# Patient Record
Sex: FEMALE | Race: WHITE | NOT HISPANIC OR LATINO | Employment: PART TIME | URBAN - METROPOLITAN AREA
[De-identification: names, ages, dates, MRNs, and addresses within clinical notes are randomized per-mention and may not be internally consistent; named-entity substitution may affect disease eponyms.]

---

## 2023-04-15 LAB
ALANINE AMINOTRANSFERASE (SGPT) (U/L) IN SER/PLAS: 18 U/L (ref 7–45)
ALBUMIN (G/DL) IN SER/PLAS: 4.3 G/DL (ref 3.4–5)
ALKALINE PHOSPHATASE (U/L) IN SER/PLAS: 52 U/L (ref 33–110)
ANION GAP IN SER/PLAS: 12 MMOL/L (ref 10–20)
ASPARTATE AMINOTRANSFERASE (SGOT) (U/L) IN SER/PLAS: 16 U/L (ref 9–39)
BASOPHILS (10*3/UL) IN BLOOD BY AUTOMATED COUNT: 0.03 X10E9/L (ref 0–0.1)
BASOPHILS/100 LEUKOCYTES IN BLOOD BY AUTOMATED COUNT: 0.8 % (ref 0–2)
BILIRUBIN TOTAL (MG/DL) IN SER/PLAS: 0.4 MG/DL (ref 0–1.2)
CALCIUM (MG/DL) IN SER/PLAS: 9.3 MG/DL (ref 8.6–10.3)
CARBON DIOXIDE, TOTAL (MMOL/L) IN SER/PLAS: 28 MMOL/L (ref 21–32)
CHLORIDE (MMOL/L) IN SER/PLAS: 103 MMOL/L (ref 98–107)
CHOLESTEROL (MG/DL) IN SER/PLAS: 193 MG/DL (ref 0–199)
CHOLESTEROL IN HDL (MG/DL) IN SER/PLAS: 68 MG/DL
CHOLESTEROL/HDL RATIO: 2.8
CREATININE (MG/DL) IN SER/PLAS: 0.78 MG/DL (ref 0.5–1.05)
EOSINOPHILS (10*3/UL) IN BLOOD BY AUTOMATED COUNT: 0.08 X10E9/L (ref 0–0.7)
EOSINOPHILS/100 LEUKOCYTES IN BLOOD BY AUTOMATED COUNT: 2.1 % (ref 0–6)
ERYTHROCYTE DISTRIBUTION WIDTH (RATIO) BY AUTOMATED COUNT: 13.2 % (ref 11.5–14.5)
ERYTHROCYTE MEAN CORPUSCULAR HEMOGLOBIN CONCENTRATION (G/DL) BY AUTOMATED: 33.9 G/DL (ref 32–36)
ERYTHROCYTE MEAN CORPUSCULAR VOLUME (FL) BY AUTOMATED COUNT: 96 FL (ref 80–100)
ERYTHROCYTES (10*6/UL) IN BLOOD BY AUTOMATED COUNT: 3.92 X10E12/L (ref 4–5.2)
ESTIMATED AVERAGE GLUCOSE FOR HBA1C: 91 MG/DL
GFR FEMALE: >90 ML/MIN/1.73M2
GLUCOSE (MG/DL) IN SER/PLAS: 98 MG/DL (ref 74–99)
HEMATOCRIT (%) IN BLOOD BY AUTOMATED COUNT: 37.8 % (ref 36–46)
HEMOGLOBIN (G/DL) IN BLOOD: 12.8 G/DL (ref 12–16)
HEMOGLOBIN A1C/HEMOGLOBIN TOTAL IN BLOOD: 4.8 %
IMMATURE GRANULOCYTES/100 LEUKOCYTES IN BLOOD BY AUTOMATED COUNT: 0.3 % (ref 0–0.9)
LDL: 106 MG/DL (ref 0–99)
LEUKOCYTES (10*3/UL) IN BLOOD BY AUTOMATED COUNT: 3.9 X10E9/L (ref 4.4–11.3)
LYMPHOCYTES (10*3/UL) IN BLOOD BY AUTOMATED COUNT: 0.91 X10E9/L (ref 1.2–4.8)
LYMPHOCYTES/100 LEUKOCYTES IN BLOOD BY AUTOMATED COUNT: 23.5 % (ref 13–44)
MONOCYTES (10*3/UL) IN BLOOD BY AUTOMATED COUNT: 0.48 X10E9/L (ref 0.1–1)
MONOCYTES/100 LEUKOCYTES IN BLOOD BY AUTOMATED COUNT: 12.4 % (ref 2–10)
NEUTROPHILS (10*3/UL) IN BLOOD BY AUTOMATED COUNT: 2.37 X10E9/L (ref 1.2–7.7)
NEUTROPHILS/100 LEUKOCYTES IN BLOOD BY AUTOMATED COUNT: 60.9 % (ref 40–80)
PLATELETS (10*3/UL) IN BLOOD AUTOMATED COUNT: 261 X10E9/L (ref 150–450)
POTASSIUM (MMOL/L) IN SER/PLAS: 4.1 MMOL/L (ref 3.5–5.3)
PROTEIN TOTAL: 7.1 G/DL (ref 6.4–8.2)
SODIUM (MMOL/L) IN SER/PLAS: 139 MMOL/L (ref 136–145)
THYROTROPIN (MIU/L) IN SER/PLAS BY DETECTION LIMIT <= 0.05 MIU/L: 3.05 MIU/L (ref 0.44–3.98)
TRIGLYCERIDE (MG/DL) IN SER/PLAS: 96 MG/DL (ref 0–149)
UREA NITROGEN (MG/DL) IN SER/PLAS: 12 MG/DL (ref 6–23)
VLDL: 19 MG/DL (ref 0–40)

## 2023-08-22 PROBLEM — I10 BENIGN ESSENTIAL HYPERTENSION: Status: ACTIVE | Noted: 2023-08-22

## 2023-08-22 PROBLEM — N93.9 ABNORMAL UTERINE BLEEDING: Status: ACTIVE | Noted: 2023-08-22

## 2023-08-22 RX ORDER — LEVOTHYROXINE SODIUM 50 UG/1
1 TABLET ORAL
COMMUNITY
Start: 2022-04-11

## 2023-08-22 RX ORDER — LOSARTAN POTASSIUM AND HYDROCHLOROTHIAZIDE 12.5; 1 MG/1; MG/1
1 TABLET ORAL DAILY
COMMUNITY

## 2023-10-02 ENCOUNTER — TREATMENT (OUTPATIENT)
Dept: PHYSICAL THERAPY | Facility: CLINIC | Age: 48
End: 2023-10-02
Payer: COMMERCIAL

## 2023-10-02 DIAGNOSIS — N94.89 OTHER SPECIFIED CONDITIONS ASSOCIATED WITH FEMALE GENITAL ORGANS AND MENSTRUAL CYCLE: ICD-10-CM

## 2023-10-02 DIAGNOSIS — M62.89 HIGH-TONE PELVIC FLOOR DYSFUNCTION: Primary | ICD-10-CM

## 2023-10-02 PROCEDURE — 97162 PT EVAL MOD COMPLEX 30 MIN: CPT | Mod: GP | Performed by: PHYSICAL THERAPIST

## 2023-10-02 PROCEDURE — 97110 THERAPEUTIC EXERCISES: CPT | Mod: GP | Performed by: PHYSICAL THERAPIST

## 2023-10-02 ASSESSMENT — PAIN SCALES - GENERAL: PAINLEVEL_OUTOF10: 5 - MODERATE PAIN

## 2023-10-02 ASSESSMENT — PAIN - FUNCTIONAL ASSESSMENT: PAIN_FUNCTIONAL_ASSESSMENT: 0-10

## 2023-10-02 NOTE — PROGRESS NOTES
Subjective Physical Therapy    Physical Therapy Evaluation and Treatment      Patient Name: Nita Miller  MRN: [id number]  Today's Date: 10/2/2023  Time Calculation  Start Time: 0315  Stop Time: 0400  Time Calculation (min): 45 min      Assessment:  Patient presents with high tone pelvic floor dysfunction causing pain with orgasm, which began 3 months ago.  Skilled PT is indicated to address pain, posture, strength, soft tissue condition, and quality of life.      Goals:  1.  No pain with orgasm.                 2.  Decrease PM voids to 0-1x.                 3.  Increase soft tissue condition to good.                 4.  Increase postural awareness/alignment to good.                 5.  Increase lumbar mobility noted by curve reversal with FB and (-) PSIS migration.                 6.  Patient will demonstrate the ability to contract-relax-eccentrically lengthen her pelvic floor.                  7.  Patient will demonstrate good control with Levels 2 and 3 TA excercises.                  8.  NIH-CPSI = 4 or less.                  9.  Patient will be independent with a home exercise program.    Plan:  Treatment/Interventions: Biofeedback, Education/ Instruction, Manual therapy, Neuromuscular re-education, Therapeutic exercises  PT Frequency: 1 time per week  Duration: 5  Rehab Potential: Good  Plan of Care Agreement: Patient    Current Problem:   1. High-tone pelvic floor dysfunction        2. Other specified conditions associated with female genital organs and menstrual cycle  Referral to Physical Therapy          Subjective  Patient discontinued birth control 1 year ago.  3 months ago, she began experiencing supra-pubic pelvic pain with orgasm.  1 month ago she had a uterine polyp removed, which reduced the intensity of her symptoms.  She also experienced the same sensation once last week when she held her urine longer than normal.       Precautions/PMH:  HTN.  2 Vaginal deliveries.  Thyroid disorder.   No  recent falls.       Pain:  Pain Assessment  Pain Assessment: 0-10  Pain Score: 5 - Moderate pain    Bladder:  Reports normal daytime frequency, but is likely closest to 1x q 2 hours.  PM 1-2x.  No urgency.  Has had stress incontinence, but no lately.      Bowel:  Evacuates daily without straining.      [place]:  Discomfort with deep penetration.  Supra-pubic pain with orgasm that resolves quickly.      NIH-CPSI = 15.          Objective   Inspection:  Posture-  Increased lumbar lordosis with segmental rotations, APT.                        Breathing-  Good diaphragm excursion.     Pelvis:  Grossly symmetrical.      Trunk Mobility/SI:  Restricted lumbar flexion with FB.  (+) PSIS Migration.      ROM/Flexibility:  BLE's in supine WNL.  Prone- n/a.      Strength:  Isolated core contractions-  TA Fair, PF Trace (external palpation).                     Resting tone- n/a.      Palpation:  Internal-  n/a.  External-  Pain and tightness along pubic bone and bilateral hip flexors.  Lumbar paraspinals tight.     Treatments:  Education on core/PF function and healthy bladder habits.  Patient to be mindful of actual daytime toileting schedule.  Reviewed good toileting posture.  Patient to utilize diaphragmatic breathing to assist emptying.  Patient instructed in pelvic tilts to decompress her low back and to find neutral spine.  Instructed to perform self massage along her pubic bone.

## 2023-10-19 ENCOUNTER — APPOINTMENT (OUTPATIENT)
Dept: PHYSICAL THERAPY | Facility: CLINIC | Age: 48
End: 2023-10-19
Payer: COMMERCIAL

## 2023-11-13 ENCOUNTER — TELEPHONE (OUTPATIENT)
Dept: OBSTETRICS AND GYNECOLOGY | Facility: CLINIC | Age: 48
End: 2023-11-13

## 2023-11-13 ENCOUNTER — TREATMENT (OUTPATIENT)
Dept: PHYSICAL THERAPY | Facility: CLINIC | Age: 48
End: 2023-11-13
Payer: COMMERCIAL

## 2023-11-13 DIAGNOSIS — N94.89 OTHER SPECIFIED CONDITIONS ASSOCIATED WITH FEMALE GENITAL ORGANS AND MENSTRUAL CYCLE: ICD-10-CM

## 2023-11-13 DIAGNOSIS — M62.89 HIGH-TONE PELVIC FLOOR DYSFUNCTION: ICD-10-CM

## 2023-11-13 PROCEDURE — 97140 MANUAL THERAPY 1/> REGIONS: CPT | Mod: GP | Performed by: PHYSICAL THERAPIST

## 2023-11-13 PROCEDURE — 97110 THERAPEUTIC EXERCISES: CPT | Mod: GP | Performed by: PHYSICAL THERAPIST

## 2023-11-13 ASSESSMENT — PAIN - FUNCTIONAL ASSESSMENT: PAIN_FUNCTIONAL_ASSESSMENT: 0-10

## 2023-11-13 ASSESSMENT — PAIN SCALES - GENERAL: PAINLEVEL_OUTOF10: 4

## 2023-11-13 NOTE — TELEPHONE ENCOUNTER
Patient called and wanted to know if in her ultrasound from 6/2023 if it says her right ovary has an 11.1 cm cyst or if its supposed to be a 1.1. There is a space between the first two ones. She is just wanting clarification.

## 2023-11-13 NOTE — PROGRESS NOTES
Physical Therapy    Physical Therapy Treatment    Patient Name: Nita Miller  MRN: [id number]  Today's Date: 11/13/2023  Visit 2/5 per poc  Time Calculation  Start Time: 0400  Stop Time: 0500  Time Calculation (min): 60 min    Assessment:  Improved knowledge of condition.  Symptoms decreasing.  Increased soft tissue condition and postural awareness.       Plan:  Continue to progress as tolerated.       Current Problem  1. Other specified conditions associated with female genital organs and menstrual cycle  Follow Up In Physical Therapy      2. High-tone pelvic floor dysfunction  Follow Up In Physical Therapy          Subjective   Patient has been focusing on emptying her bladder.  Using diaphragmatic breathing.  Performing PPT in the morning.    [place]-  pain with orgasm slightly decreased.       Precautions  Precautions  Precautions Comment: No recent falls.     Pain  Pain Assessment: 0-10  Pain Score: 4    Objective      Posture-  increased lumbar lordosis/APT.  Lumbar spine stays extended in supine.       Treatments  TherX-  Reviewed bladder habits and strategies to improve emptying.  Reviewed diaphragmatic breathing and discussed why it is helpful.  Instructed in the use of three fast kegels to address frequency.      Practiced PPT in [facility] after education on position.  Used pelvic tilts in sit and stand to find neutral spine position.     Instructed in and performed single knee to chest with opposite leg straight to lengthen hip flexors.  Handout emailed.      MTT-  OP PF.  TP release at pubes.  STM lower abdominals and hip flexors.

## 2023-12-28 ENCOUNTER — APPOINTMENT (OUTPATIENT)
Dept: PHYSICAL THERAPY | Facility: CLINIC | Age: 48
End: 2023-12-28
Payer: COMMERCIAL

## 2024-07-05 ENCOUNTER — DOCUMENTATION (OUTPATIENT)
Dept: PHYSICAL THERAPY | Facility: CLINIC | Age: 49
End: 2024-07-05
Payer: COMMERCIAL

## 2024-07-05 NOTE — PROGRESS NOTES
Physical Therapy    Discharge Summary    Name: Nita Miller  MRN: [id number]  : 1975  Date: 24    Discharge Summary: PT    Discharge Information: Date of last visit 23, Date of evaluation 10-2-23, Number of attended visits 2, Referred by Dr. Montero, and Referred for [facility]PFD.    Rehab Discharge Reason: Other DC due to length of time since last visit.

## 2025-01-09 ASSESSMENT — ENCOUNTER SYMPTOMS
HEADACHES: 0
DYSURIA: 0
BELCHING: 0
DIARRHEA: 1
FREQUENCY: 0
ANOREXIA: 0
FLATUS: 0
NAUSEA: 0
WEIGHT LOSS: 0
VOMITING: 0
ABDOMINAL PAIN: 1
HEMATOCHEZIA: 0
FEVER: 0
HEMATURIA: 0
MYALGIAS: 1
ARTHRALGIAS: 1
CONSTIPATION: 0

## 2025-01-10 ENCOUNTER — APPOINTMENT (OUTPATIENT)
Dept: OBSTETRICS AND GYNECOLOGY | Facility: CLINIC | Age: 50
End: 2025-01-10
Payer: COMMERCIAL

## 2025-01-10 VITALS
BODY MASS INDEX: 33.29 KG/M2 | DIASTOLIC BLOOD PRESSURE: 72 MMHG | SYSTOLIC BLOOD PRESSURE: 120 MMHG | HEIGHT: 65 IN | WEIGHT: 199.8 LBS

## 2025-01-10 DIAGNOSIS — R10.2 PELVIC PAIN IN FEMALE: ICD-10-CM

## 2025-01-10 DIAGNOSIS — Z12.31 ENCOUNTER FOR SCREENING MAMMOGRAM FOR MALIGNANT NEOPLASM OF BREAST: ICD-10-CM

## 2025-01-10 DIAGNOSIS — Z01.419 ENCNTR FOR GYN EXAM (GENERAL) (ROUTINE) W/O ABN FINDINGS: Primary | ICD-10-CM

## 2025-01-10 PROCEDURE — 3078F DIAST BP <80 MM HG: CPT | Performed by: ADVANCED PRACTICE MIDWIFE

## 2025-01-10 PROCEDURE — 1036F TOBACCO NON-USER: CPT | Performed by: ADVANCED PRACTICE MIDWIFE

## 2025-01-10 PROCEDURE — 3074F SYST BP LT 130 MM HG: CPT | Performed by: ADVANCED PRACTICE MIDWIFE

## 2025-01-10 PROCEDURE — 99396 PREV VISIT EST AGE 40-64: CPT | Performed by: ADVANCED PRACTICE MIDWIFE

## 2025-01-10 PROCEDURE — 3008F BODY MASS INDEX DOCD: CPT | Performed by: ADVANCED PRACTICE MIDWIFE

## 2025-01-10 ASSESSMENT — PATIENT HEALTH QUESTIONNAIRE - PHQ9
2. FEELING DOWN, DEPRESSED OR HOPELESS: NOT AT ALL
SUM OF ALL RESPONSES TO PHQ9 QUESTIONS 1 AND 2: 0
1. LITTLE INTEREST OR PLEASURE IN DOING THINGS: NOT AT ALL

## 2025-01-10 NOTE — PROGRESS NOTES
"GYNECOLOGY PROGRESS NOTE        CC:  see below. Patient has seen  for pain and had a polyp removed in the past and had seen pelvic floor therapy due to postorgasmic pain  pubic bone source. She feels that the pelvic floor therapy did help her in the past.   She is now having pressure and then numbness along the left side of her pelvis and down. The numbness varies in how quickly it occurs. The numbness occurs with laying down, sitting and even standing for more then 10minutes. This has been occurring for about 2-3months. She is having other issues and health concerns and is seeing other providers also to rule other conditions out. She sees Rheumatology soon for evaluation.   Chief Complaint   Patient presents with    Annual Exam     Est pt annual  Pap 6/2023 neg hpv neg  Mamm 8/2022 cat 1  Patient having left sided pelvic pain.         HPI:  Nita Miller is here for a routine GYN examination.  No GYN c/o, no AUB.            ROS:  GI - no blood in BMs  URO - no hematuria  GYN - no AUB or vaginal discharge  PSYCH - mood OK        PHYSICAL EXAM:  /72 (BP Location: Left arm, Patient Position: Sitting, BP Cuff Size: Adult)   Ht 1.651 m (5' 5\")   Wt 90.6 kg (199 lb 12.8 oz)   BMI 33.25 kg/m²   GEN:  A&O, NAD  ABD:  NT/ND, soft, no palpable masses  URO:  normal urethra, no bladder TTP  GYN:  normal vulva and perineum w/o lesions or ulcers, normal vagina without discharge or lesions, normal cervix without lesions or discharge or CMT, uterus NT/NE, adnexa mobile and NT/NE  BREAST:  no masses or TTP, no skin lesions or nipple discharge  PSYCH:  normal affect, non-anxious      IMPRESSION/PLAN:    A: normal exam. No masses noted with pelvic. Pressure/numbness down Left side that varies on how soon is occurs depending on position. Normal pap 6/2023.   Plan: 1. Pap due 2026. 2.mammogram order placed. 3. Pelvic sonogram, if all wnl then consider either follow up again with pelvic floor PT or back " to see  for further evaluation.   Problem List Items Addressed This Visit    None  Visit Diagnoses       Encounter for screening mammogram for malignant neoplasm of breast                Pap and HPV normal in 2023, no Hx of HGSIL, next due in 2028.   [facility] pap smear screening guidelines reviewed with the patient.          MILLI Sutton  Answers submitted by the patient for this visit:  Abdominal Pain Questionnaire (Submitted on 1/9/2025)  Chief Complaint: Abdominal pain  Chronicity: new  Onset: more than 1 month ago  Onset quality: gradual  Frequency: daily  Episode duration: 2 Hours  Progression since onset: rapidly worsening  Pain location: LLQ  Pain - numeric: 3/10  Pain quality: burning  Radiates to: left flank  anorexia: No  arthralgias: Yes  belching: No  constipation: No  diarrhea: Yes  dysuria: No  fever: No  flatus: No  frequency: No  headaches: No  hematochezia: No  hematuria: No  melena: No  myalgias: Yes  nausea: No  weight loss: No  vomiting: No  Aggravated by: certain positions  Relieved by: certain positions

## 2025-01-13 ENCOUNTER — HOSPITAL ENCOUNTER (OUTPATIENT)
Dept: RADIOLOGY | Facility: HOSPITAL | Age: 50
Discharge: HOME | End: 2025-01-13
Payer: COMMERCIAL

## 2025-01-13 DIAGNOSIS — R10.2 PELVIC PAIN IN FEMALE: ICD-10-CM

## 2025-01-13 PROCEDURE — 76856 US EXAM PELVIC COMPLETE: CPT

## 2025-01-13 PROCEDURE — 76830 TRANSVAGINAL US NON-OB: CPT | Performed by: RADIOLOGY

## 2025-01-13 PROCEDURE — 76856 US EXAM PELVIC COMPLETE: CPT | Performed by: RADIOLOGY

## 2025-01-17 ENCOUNTER — APPOINTMENT (OUTPATIENT)
Facility: CLINIC | Age: 50
End: 2025-01-17
Payer: COMMERCIAL

## 2025-01-25 ENCOUNTER — HOSPITAL ENCOUNTER (OUTPATIENT)
Dept: RADIOLOGY | Facility: HOSPITAL | Age: 50
Discharge: HOME | End: 2025-01-25
Payer: COMMERCIAL

## 2025-01-25 VITALS — HEIGHT: 65 IN | BODY MASS INDEX: 33.15 KG/M2 | WEIGHT: 199 LBS

## 2025-01-25 DIAGNOSIS — Z12.31 ENCOUNTER FOR SCREENING MAMMOGRAM FOR MALIGNANT NEOPLASM OF BREAST: ICD-10-CM

## 2025-01-25 PROCEDURE — 77067 SCR MAMMO BI INCL CAD: CPT | Mod: BILATERAL PROCEDURE | Performed by: RADIOLOGY

## 2025-01-25 PROCEDURE — 77063 BREAST TOMOSYNTHESIS BI: CPT | Mod: BILATERAL PROCEDURE | Performed by: RADIOLOGY

## 2025-01-25 PROCEDURE — 77067 SCR MAMMO BI INCL CAD: CPT

## 2025-02-03 ENCOUNTER — HOSPITAL ENCOUNTER (OUTPATIENT)
Dept: RADIOLOGY | Facility: CLINIC | Age: 50
Discharge: HOME | End: 2025-02-03
Payer: COMMERCIAL

## 2025-02-03 ENCOUNTER — OFFICE VISIT (OUTPATIENT)
Dept: ORTHOPEDIC SURGERY | Facility: CLINIC | Age: 50
End: 2025-02-03
Payer: COMMERCIAL

## 2025-02-03 DIAGNOSIS — M79.642 BILATERAL HAND PAIN: ICD-10-CM

## 2025-02-03 DIAGNOSIS — G56.02 CARPAL TUNNEL SYNDROME OF LEFT WRIST: Primary | ICD-10-CM

## 2025-02-03 DIAGNOSIS — M79.641 BILATERAL HAND PAIN: ICD-10-CM

## 2025-02-03 DIAGNOSIS — M19.032 DRUJ (DISTAL RADIOULNAR JOINT) ARTHROSIS, PRIMARY, LEFT: ICD-10-CM

## 2025-02-03 PROCEDURE — 73130 X-RAY EXAM OF HAND: CPT | Mod: 50

## 2025-02-03 PROCEDURE — 2500000004 HC RX 250 GENERAL PHARMACY W/ HCPCS (ALT 636 FOR OP/ED): Performed by: ORTHOPAEDIC SURGERY

## 2025-02-03 PROCEDURE — 20526 THER INJECTION CARP TUNNEL: CPT | Mod: LT | Performed by: ORTHOPAEDIC SURGERY

## 2025-02-03 PROCEDURE — 20605 DRAIN/INJ JOINT/BURSA W/O US: CPT | Mod: LT | Performed by: ORTHOPAEDIC SURGERY

## 2025-02-03 PROCEDURE — 99214 OFFICE O/P EST MOD 30 MIN: CPT | Performed by: ORTHOPAEDIC SURGERY

## 2025-02-03 PROCEDURE — 73130 X-RAY EXAM OF HAND: CPT | Mod: BILATERAL PROCEDURE | Performed by: STUDENT IN AN ORGANIZED HEALTH CARE EDUCATION/TRAINING PROGRAM

## 2025-02-03 PROCEDURE — 99214 OFFICE O/P EST MOD 30 MIN: CPT | Mod: 25 | Performed by: ORTHOPAEDIC SURGERY

## 2025-02-03 RX ORDER — LIDOCAINE HYDROCHLORIDE 10 MG/ML
1 INJECTION, SOLUTION INFILTRATION; PERINEURAL
Status: COMPLETED | OUTPATIENT
Start: 2025-02-03 | End: 2025-02-03

## 2025-02-03 RX ADMIN — TRIAMCINOLONE ACETONIDE 10 MG: 10 INJECTION, SUSPENSION INTRA-ARTICULAR; INTRALESIONAL at 15:41

## 2025-02-03 RX ADMIN — LIDOCAINE HYDROCHLORIDE 1 ML: 10 INJECTION, SOLUTION INFILTRATION; PERINEURAL at 15:41

## 2025-02-03 NOTE — PROGRESS NOTES
History present illness: Patient presents today for evaluation of bilateral hands and wrist.  She describes a general polyarthralgia affecting small joints of hands and larger joints as well.  She states that rheumatoid panel was done showing only elevated serum uric acid and CRP.  The patient has pain worse on the left as compared to the right.  She describes symptoms compatible with left carpal tunnel syndrome and ulnar-sided wrist pain worse with rotational motion.  She has right wrist pain as well, also ulnar-sided.  No discrete injury.      Past medical history: The patient's past medical history, family history, social history, and review of systems were documented on the patient medical intake.  The updated data was reviewed in the electronic medical record.  History is negative except otherwise stated in history of present illness.        Physical examination:  General: Alert and oriented to person, place, and time.  No acute distress and breathing comfortably: Pleasant and cooperative with examination.  HEENT: Head is normocephalic and atraumatic.  Neck: Supple, no visible swelling.  Cardiovascular: No palpable tachycardia  Lungs: No audible wheezing or labored breathing  Abdomen: Nondistended.  Extremities: Evaluation of the bilateral upper extremities finds the patient had palpable radial artery at the wrists with brisk capillary refill to all digits.  Patient has intact sensation to axillary radial median and ulnar nerves.  There are no open wounds.  There are no signs of infection.  There is no evidence of lymphedema or lymphatic streaking.  The patient has supple compartments to bilateral arm forearm and hand.  Mild tenderness to right wrist at DRUJ.  More impressive tenderness over left wrist at DRUJ.  Positive Tinel's over course of median nerve to left wrist.      Radiology: Bilateral DRUJ arthritic change      Assessment: Bilateral DRUJ arthritis.  Left carpal tunnel syndrome.      Plan: Treatment  options were discussed.  We talked about trial of steroid injection to left carpal tunnel and left wrist at [facility].  Patient is agreeable with this strategy.  Observe the right for now.  Follow-up with me in 6 weeks.  She has an appointment to see rheumatology for ongoing discussion regarding possible autoimmune origin.  Certainly the characteristics of her [facility] arthritis are suspicious for autoimmune process as is the general polyarthralgia.        Procedure:  Hand / UE Inj/Asp: L carpal tunnel for carpal tunnel syndrome on 2/3/2025 3:41 PM  Indications: diagnostic and therapeutic  Details: 25 G needle, volar approach  Medications: 10 mg triamcinolone acetonide 10 mg/mL; 1 mL lidocaine 10 mg/mL (1 %)  Outcome: tolerated well, no immediate complications    Left Carpal Tunnel Injection: It was explained to the patient that the risks of a steroid injection include but are not limited to infection, local skin irritation, skin atrophy, calcification, continued pain and discomfort, elevated blood sugar, burning, failure to relieve pain, and possible late infection. The patient verbalized good insight and verbalized consent for the injection. It was further explained that the post-injection discomfort can be alleviated with additional medications, ice, elevation, and rest over the first 24 hours, and that these modalities are recommended.  After informed consent was provided, patient identification was confirmed, and allergies were verified, the patient was appropriately positioned. The site was marked and time-out performed.    Using aseptic technique, a solution containing 1 mL of 10 mg of Kenalog and 1 mL of 1% lidocaine without epinephrine was injected into the patient's left carpal tunnel. A 25-gauge needle was inserted just ulnar to the anatomic course of the palmaris longus tendon at the level of the distal wrist flexion crease. It was slowly advanced deep to the distal antebrachial fascia. The solution was then  injected slowly, taking care to ensure that the patient experienced no paresthesias during the injection of the solution. After injection of the solution, the patient was asked to perform active flexion and extension of the digits and wrist to help disperse the solution. A band-aid was then placed at the injection site. The patient tolerated this left carpal tunnel injection well.      Procedure, treatment alternatives, risks and benefits explained, specific risks discussed. Consent was given by the patient. Immediately prior to procedure a time out was called to verify the correct patient, procedure, equipment, support staff and site/side marked as required. Patient was prepped and draped in the usual sterile fashion.       M Inj/Asp: L distal radioulnar on 2/3/2025 3:41 PM  Indications: pain  Details: 25 G needle, dorsal approach  Medications: 1 mL lidocaine 10 mg/mL (1 %); 10 mg triamcinolone acetonide 10 mg/mL  Outcome: tolerated well, no immediate complications    Left Wrist Distal Radioulnar Joint Injection: It was explained to the patient that the risks of a steroid injection include but are not limited to infection, local skin irritation, skin atrophy, calcification, continued pain and discomfort, elevated blood sugar, burning, failure to relieve pain, and possible late infection. The patient verbalized good insight and verbalized consent for the injection. It was further explained that the post-injection discomfort can be alleviated with additional medications, ice, elevation, and rest over the first 24 hours, and that these modalities are recommended.    Using aseptic technique, a solution containing 1 cc of 10 mg of Kenalog and 1.0 mL of 1% lidocaine without epinephrine was injected intra-articularly to the left wrist distal radioulnar joint. Digital palpation was used to localize the DRUJ articulation about the dorsal aspect of the joint. A 25-gauge needle was advanced through the skin, subcutaneous  tissue and capsule. The injection was then administered and the patient tolerated the injection well. A band-aid was then placed. It should be noted that ethyl chloride spray was used to make the injection delivery more comfortable for the patient.  Procedure, treatment alternatives, risks and benefits explained, specific risks discussed. Consent was given by the patient. Immediately prior to procedure a time out was called to verify the correct patient, procedure, equipment, support staff and site/side marked as required. Patient was prepped and draped in the usual sterile fashion.

## 2025-02-09 NOTE — PROGRESS NOTES
Subjective   Patient ID: [id number]   Nita Miller is a 49 y.o. female with HTN, hypothyroidism, who presents for polyarthralgia, referred by ortho Ms. Cordoba    Current rheum meds:  - Aleve BID     Previous rheum meds:  - Ibuprofen  - Steroids    HPI  Saw ortho this month for kamilla hands and wrists pain, diagnosed with OA and CTS, had steroid shots to her left wrist and CTS  In September, developed Achilles tendinitis, plantar fasciitis, tennis elbows and IT band on both knees, all at the same time, might have taken ibuprofen, they didn't last long  In October, both wrists and knees became painful  Mainly pain in the morning  No injuries   Started taking taking ibuprofen systemically, helped some but not much, still couldn't walk in the morning   Right 1st IP and left 2nd DIP pain and swelling started after that, limited mobility  Dr. Ledezma did injection of the left wrist and CTS   The injections helped a lot  Now feels 80% better  Took steroids before and they didn't help  No IBP  No buttocks pains  No eye inflammation    + numbness of the left leg   + Swelling of her left leg, from the knee down  + used to get costochondritis with increased activity   + her ears get red and painful when she is tired  + numbness in her left hand, distribution of the median nerve     PSH: No MSK surgeries   Allergies: NKDA   Habits: No smoking, drinks 3 glasses of alcohol 5 times a week, no recreational drugs   Social hx: Works as an ortho PA,      ROS:  Constitutional: Denies fever, chills, weight loss, night sweats or headaches  Eyes: Denies dry eyes, blurry vision, redness or pain  ENT: Denies dry mouth, dental loss, loss of taste, nasal or oral ulcers, jaw claudication, difficulty swallowing, nasal crusting or recurrent sinus infections   Cardiovascular: Denies chest pain, palpitations, orthopnea  Respiratory: Denies shortness of breath, cough, asthma, or recurrent respiratory infections  Gastrointestinal: Denies  dysphagia, nausea, vomiting, heartburn, abdominal pain, constipation, diarrhea, melena or hematochezia  Genitourinary: No recurrent urinary infections or STDs, no genital or anal ulcers  Integumentary: Denies photosensitivity, rash or lesions, Raynaud's phenomenon, skin tightening, digital ulcers, psoriatic lesions, or alopecia  Neurological: Denies muscle weakness, or incontinence   MSK:  No inflammatory back pain,  Muscular: Denies weakness, difficulty rising from chair or combing the hair, muscle aches, or problems with hand    FHx: Hypothyroidism    Patient Active Problem List   Diagnosis    Benign essential hypertension    Abnormal uterine bleeding    High-tone pelvic floor dysfunction      Past Medical History:   Diagnosis Date    Abnormal Pap smear of cervix     CTS (carpal tunnel syndrome)     Disease of thyroid gland     HPV (human papilloma virus) infection     Hypertension     Hypothyroidism     Plantar fasciitis     Tendinitis     Varicella      Past Surgical History:   Procedure Laterality Date    CERVICAL BIOPSY  W/ LOOP ELECTRODE EXCISION      COLPOSCOPY      OTHER SURGICAL HISTORY  10/12/2022    Corneal lasik    OTHER SURGICAL HISTORY  10/12/2022    [place] tooth extraction     Social History     Socioeconomic History    Marital status:      Spouse name: Not on file    Number of children: Not on file    Years of education: Not on file    Highest education level: Not on file   Occupational History    Not on file   Tobacco Use    Smoking status: Never    Smokeless tobacco: Never   Substance and Sexual Activity    Alcohol use: Yes     Alcohol/week: 10.0 standard drinks of alcohol     Types: 10 Glasses of wine per week    Drug use: Never    Sexual activity: Yes     Partners: Male     Birth control/protection: None   Other Topics Concern    Not on file   Social History Narrative    Not on file     Social Drivers of Health     Financial Resource Strain: Not on file   Food Insecurity: Not on file    Transportation Needs: Not on file   Physical Activity: Not on file   Stress: Not on file   Social Connections: Not on file   Intimate Partner Violence: Not on file   Housing Stability: Not on file      No Known Allergies     Current Outpatient Medications:     levothyroxine (Synthroid, Levoxyl) 50 mcg tablet, Take 1 tablet (50 mcg) by mouth once daily in the morning. Take before meals., Disp: , Rfl:     losartan-hydrochlorothiazide (Hyzaar) 100-12.5 mg tablet, Take 1 tablet by mouth once daily., Disp: , Rfl:      Objective   Visit Vitals  /80   Pulse 92   Temp 36.7 °C (98.1 °F)   Resp 20   Wt 91.2 kg (201 lb)   BMI 33.09 kg/m²   OB Status Having periods   Smoking Status Never   BSA 2.05 m²     Physical Exam:  General: AAOx3, Cooperative  Head: normocephalic, atraumatic, no hair loss   Eyes: EOMI, conjunctiva clear, sclera white, anicteric  Ears: hearing intact  Nose: no deformity, no crusting   Throat/Mouth: No oral deformities, no cheek swelling, mucosa appear moist, no oral ulcers noted or loss of dentition   Skin: No rashes, ulcers or photosensitive areas  MSK: Upper Extremities:  TTP of the costochondral areas  Hand/Fingers: No erythema, edema, tenderness or warmth at DIP, PIP, or MCP joints, FROM grossly. Good hand . No nodules. Heberden's nodes  Wrists: No erythema, edema, warmth or tenderness at wrist, FROM grossly  Elbows: No tenderness, edema, erythema or warmth at elbows, FROM grossly. No nodules   Shoulders: No edema, erythema, tenderness or warmth at shoulders. FROM  Lower Extremities:   Hips: No obvious deformities. No joint tenderness, normal ROM grossly. Log roll test negative bilaterally. Jyotsna test is negative bilaterally. No trochanteric bursae TTP  Knees: No tenderness, deformities, edema, rashes, or warmth, normal ROM grossly. No crepitus, no pes anserine bursa TTP   Right calf is a bit bigger than the left calf  Ankles, feet: No deformities, tenderness, edema, erythema, ulceration,  or warmth at the ankle or MTP/IP joints, normal ROM grossly  Spine: No spinal tenderness to palpation. No SI joint tenderness      Lab Results   Component Value Date    WBC 3.9 (L) 04/15/2023    HGB 12.8 04/15/2023    HCT 37.8 04/15/2023    MCV 96 04/15/2023     04/15/2023        Chemistry    Lab Results   Component Value Date/Time     04/15/2023 1004    K 4.1 04/15/2023 1004     04/15/2023 1004    CO2 28 04/15/2023 1004    BUN 12 04/15/2023 1004    CREATININE 0.78 04/15/2023 1004    Lab Results   Component Value Date/Time    CALCIUM 9.3 04/15/2023 1004    ALKPHOS 52 04/15/2023 1004    AST 16 04/15/2023 1004    ALT 18 04/15/2023 1004    BILITOT 0.4 04/15/2023 1004        Lab Results   Component Value Date    NEUTROABS 2.37 04/15/2023     Lab Results   Component Value Date    ALT 18 04/15/2023    AST 16 04/15/2023    ALKPHOS 52 04/15/2023    BILITOT 0.4 04/15/2023      Lab Results   Component Value Date    CALCIUM 9.3 04/15/2023     XR hand 3+ views bilateral  Narrative: Interpreted By:  Emmett Valderrama,   STUDY:  XR HAND 3+ VIEWS BILATERAL; ;  2/3/2025 8:12 am      INDICATION:  Signs/Symptoms:pain.      ,M79.641 Pain in right hand,M79.642 Pain in left hand      COMPARISON:  None.      ACCESSION NUMBER(S):  [id number]      ORDERING CLINICIAN:  RENAN ALFREDO      FINDINGS:  Three views of the bilateral hands.      There is bilateral ulnar negative variance with suggestion of mass  effect on the distal radius by the ulna which may reflect distal  radioulnar impingement. No acute fractures. No dislocations. The soft  tissues are unremarkable.      Impression: Findings which may reflect bilateral distal radioulnar impingement in  the appropriate clinical scenario.      MACRO:  None      Signed by: Emmett Valderrama 2/3/2025 1:03 PM  Dictation workstation:   [id number]     === 02/03/25 ===  XR HAND 3+ VIEWS BILATERAL  Findings which may reflect bilateral distal radioulnar impingement in the appropriate  clinical scenario.      Assessment/Plan    This is a 49 y.o. female with HTN, hypothyroidism, who presents for polyarthralgia, referred by ortho Ms. Cordoba    The patient's sx are not currently suggestive of IA, but she does reports hx of enthesitis (costochondritis, Achilles' tendinitis, Plantar fasciitis ...). No IBP, uveitis, buttocks pains or dactylitis. No Psoriasis or IBD or recurrent infections. No sx of CTD. She has left sided CTS and bilateral wrist OA, no previous injury, steroid shot helped, but not oral steroids. Will do the work up    Labs:  11/24: CRP 1 (ULN of 0.5). UA 5.9  RF, CCP neg  7/23: WBCs 3.9K, A1c 4.8%, rest of CBC, TSH, CMP wnl    Imaging:  Xray hands: Findings which may reflect bilateral distal radioulnar impingement in  the appropriate clinical scenario    # Polyarthralgia   # OA of the wrists  # Left CTS    - Labs today  - Xrays today  - If needed, will do either MRI of the left knee with contrast or US of the hands with wrists   - I will inform the patient of any urgent results, if any    RTC in 1 month for discussion of results     Plan, including risks and benefits, was discussed with the patient, informed on how to reach us.     To schedule an appointment, call [phone]  To reach the rheumatology office, call [phone]    Danita Jones MD      Division of Rheumatology  [facility]

## 2025-02-14 DIAGNOSIS — M25.50 POLYARTHRALGIA: Primary | ICD-10-CM

## 2025-02-17 ENCOUNTER — APPOINTMENT (OUTPATIENT)
Dept: RHEUMATOLOGY | Facility: CLINIC | Age: 50
End: 2025-02-17
Payer: COMMERCIAL

## 2025-02-17 ENCOUNTER — HOSPITAL ENCOUNTER (OUTPATIENT)
Dept: RADIOLOGY | Facility: CLINIC | Age: 50
Discharge: HOME | End: 2025-02-17
Payer: COMMERCIAL

## 2025-02-17 VITALS
SYSTOLIC BLOOD PRESSURE: 159 MMHG | WEIGHT: 201 LBS | DIASTOLIC BLOOD PRESSURE: 80 MMHG | BODY MASS INDEX: 33.09 KG/M2 | TEMPERATURE: 98.1 F | HEART RATE: 92 BPM | RESPIRATION RATE: 20 BRPM

## 2025-02-17 DIAGNOSIS — M19.032 PRIMARY OSTEOARTHRITIS OF BOTH WRISTS: ICD-10-CM

## 2025-02-17 DIAGNOSIS — M25.50 POLYARTHRALGIA: Primary | ICD-10-CM

## 2025-02-17 DIAGNOSIS — G56.02 CARPAL TUNNEL SYNDROME OF LEFT WRIST: ICD-10-CM

## 2025-02-17 DIAGNOSIS — M19.031 PRIMARY OSTEOARTHRITIS OF BOTH WRISTS: ICD-10-CM

## 2025-02-17 PROCEDURE — 73562 X-RAY EXAM OF KNEE 3: CPT | Mod: LEFT SIDE | Performed by: RADIOLOGY

## 2025-02-17 PROCEDURE — 3079F DIAST BP 80-89 MM HG: CPT | Performed by: STUDENT IN AN ORGANIZED HEALTH CARE EDUCATION/TRAINING PROGRAM

## 2025-02-17 PROCEDURE — 73562 X-RAY EXAM OF KNEE 3: CPT | Mod: LT

## 2025-02-17 PROCEDURE — 3077F SYST BP >= 140 MM HG: CPT | Performed by: STUDENT IN AN ORGANIZED HEALTH CARE EDUCATION/TRAINING PROGRAM

## 2025-02-17 PROCEDURE — 99204 OFFICE O/P NEW MOD 45 MIN: CPT | Performed by: STUDENT IN AN ORGANIZED HEALTH CARE EDUCATION/TRAINING PROGRAM

## 2025-03-31 ENCOUNTER — APPOINTMENT (OUTPATIENT)
Dept: ORTHOPEDIC SURGERY | Facility: CLINIC | Age: 50
End: 2025-03-31
Payer: COMMERCIAL

## 2025-04-07 ENCOUNTER — OFFICE VISIT (OUTPATIENT)
Dept: ORTHOPEDIC SURGERY | Facility: CLINIC | Age: 50
End: 2025-04-07
Payer: COMMERCIAL

## 2025-04-07 DIAGNOSIS — M79.641 BILATERAL HAND PAIN: Primary | ICD-10-CM

## 2025-04-07 DIAGNOSIS — G56.02 CARPAL TUNNEL SYNDROME OF LEFT WRIST: ICD-10-CM

## 2025-04-07 DIAGNOSIS — M19.032 DRUJ (DISTAL RADIOULNAR JOINT) ARTHROSIS, PRIMARY, LEFT: ICD-10-CM

## 2025-04-07 DIAGNOSIS — M79.642 BILATERAL HAND PAIN: Primary | ICD-10-CM

## 2025-04-07 PROCEDURE — 99213 OFFICE O/P EST LOW 20 MIN: CPT | Performed by: ORTHOPAEDIC SURGERY

## 2025-04-07 PROCEDURE — 1036F TOBACCO NON-USER: CPT | Performed by: ORTHOPAEDIC SURGERY

## 2025-04-07 NOTE — PROGRESS NOTES
4/7/2025    Chief Complaint   Patient presents with    Left Hand - Follow-up     B/L DRUJ Arthritis, Lt CTS S/P Inj 2/3/25    Right Hand - Follow-up     B/L DRUJ Arthritis       History of Present Illness:  Patient Nita Miller , 49 y.o. female, presents today, 4/7/2025, for evaluation of bilateral hand and wrist pain.  She underwent bilateral wrist DRUJ and left carpal tunnel injection in February.  She got good symptomatic relief of her DRUJ pain, range of motion is improved and swelling is gone down.  She states she did not really notice much benefit from the carpal tunnel injection at first, however, over time as her overall symptoms flared, the numbness and tingling to her median nerve distribution of the left wrist resolved.  She did follow-up with rheumatology that felt she may have some component of autoimmune condition but felt that she did not meet the marc diagnosis for RA or lupus or other autoimmune condition.  They discussed possibility of some form of a palindromic rheumatism.  She has pending blood work but has held off on getting this as her symptoms have subsided now and she is waiting to have this performed when symptoms flare again.  We feel this is reasonable.       Review of Systems:   GENERAL: Negative  GI: Negative  MUSCULOSKELETAL: See HPI  SKIN: Negative  NEURO:  Negative     Physical Exam:  GENERAL:  Alert and oriented to person, place, and time.  No acute distress and breathing comfortably; pleasant and cooperative with the examination.  HEENT:  Head is normocephalic and atraumatic.  NECK:  Supple, no visible swelling.  CARDIOVASCULAR:  No palpable tachycardia.  LUNGS:  No audible wheezing or labored breathing.  ABDOMEN:  Nondistended.  Extremities: Evaluation of bilateral upper extremities finds the patient to have a palpable radial artery at the wrist with brisk capillary refill to all digits. The patient has intact sensorium to axillary, radial, median and ulnar nerves.  There are no open wounds. There are no signs of infection. There is no evidence of lymphedema or lymphatic streaking. The patient has supple compartments of the bilateral arms, forearms and hands.  Full pronation and supination on exam today.  Full extension and flexion compared bilaterally as well.  Decreased swelling over the DRUJ's.     Imaging/Test Results:  None today.     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     Plan:  Treatment options were reviewed at length.  For now we discussed continued simple observation.  She will follow-up as needed for repeat injections if symptoms flare.  We recommend she get her blood work done if symptoms flare to see if there is anything positive.  We feel this is a very reasonable strategy for her.  Follow-up ultimately as needed as symptoms dictate.    In a face to face encounter, I performed a history and physical examination, discussed pertinent diagnostic studies if indicated, and discussed diagnosis and management strategies with both the patient and the mid-level provider. I reviewed the mid-level's note and agree with the documented findings and plan of care.  Patient presents today status post bilateral DRUJ injection and left carpal tunnel injection.  Overall improved.  Saw a rheumatology.  Unclear to me as to whether or not she is distinctly labeled with an autoimmune process or not.  Regardless there is no plan for medical treatment at this time.  She is weaning away from her Aleve and still continues to do well.  Nice motion on today's exam.  Follow-up with me if and when symptoms flare.

## 2025-04-16 ENCOUNTER — APPOINTMENT (OUTPATIENT)
Dept: RHEUMATOLOGY | Facility: CLINIC | Age: 50
End: 2025-04-16
Payer: COMMERCIAL

## 2025-07-09 ENCOUNTER — APPOINTMENT (OUTPATIENT)
Dept: CARDIOLOGY | Facility: HOSPITAL | Age: 50
DRG: 392 | End: 2025-07-09
Payer: COMMERCIAL

## 2025-07-09 ENCOUNTER — APPOINTMENT (OUTPATIENT)
Dept: RADIOLOGY | Facility: HOSPITAL | Age: 50
DRG: 392 | End: 2025-07-09
Payer: COMMERCIAL

## 2025-07-09 ENCOUNTER — HOSPITAL ENCOUNTER (INPATIENT)
Facility: HOSPITAL | Age: 50
LOS: 1 days | Discharge: HOME | DRG: 392 | End: 2025-07-10
Attending: EMERGENCY MEDICINE | Admitting: SURGERY
Payer: COMMERCIAL

## 2025-07-09 DIAGNOSIS — K57.92 ACUTE DIVERTICULITIS: Primary | ICD-10-CM

## 2025-07-09 DIAGNOSIS — R10.2 SUPRAPUBIC ABDOMINAL PAIN: ICD-10-CM

## 2025-07-09 DIAGNOSIS — K57.81: ICD-10-CM

## 2025-07-09 LAB
ALBUMIN SERPL BCP-MCNC: 4.6 G/DL (ref 3.4–5)
ALP SERPL-CCNC: 71 U/L (ref 33–110)
ALT SERPL W P-5'-P-CCNC: 23 U/L (ref 7–45)
ANION GAP SERPL CALC-SCNC: 14 MMOL/L (ref 10–20)
APPEARANCE UR: CLEAR
AST SERPL W P-5'-P-CCNC: 15 U/L (ref 9–39)
ATRIAL RATE: 93 BPM
BASOPHILS # BLD AUTO: 0.05 X10*3/UL (ref 0–0.1)
BASOPHILS NFR BLD AUTO: 0.4 %
BILIRUB SERPL-MCNC: 1.3 MG/DL (ref 0–1.2)
BILIRUB UR STRIP.AUTO-MCNC: NEGATIVE MG/DL
BUN SERPL-MCNC: 9 MG/DL (ref 6–23)
CALCIUM SERPL-MCNC: 9.8 MG/DL (ref 8.6–10.3)
CARDIAC TROPONIN I PNL SERPL HS: 3 NG/L (ref 0–13)
CHLORIDE SERPL-SCNC: 96 MMOL/L (ref 98–107)
CO2 SERPL-SCNC: 28 MMOL/L (ref 21–32)
COLOR UR: COLORLESS
CREAT SERPL-MCNC: 0.74 MG/DL (ref 0.5–1.05)
EGFRCR SERPLBLD CKD-EPI 2021: >90 ML/MIN/1.73M*2
EOSINOPHIL # BLD AUTO: 0.02 X10*3/UL (ref 0–0.7)
EOSINOPHIL NFR BLD AUTO: 0.2 %
ERYTHROCYTE [DISTWIDTH] IN BLOOD BY AUTOMATED COUNT: 13.7 % (ref 11.5–14.5)
GLUCOSE SERPL-MCNC: 112 MG/DL (ref 74–99)
GLUCOSE UR STRIP.AUTO-MCNC: NORMAL MG/DL
HCT VFR BLD AUTO: 36.7 % (ref 36–46)
HGB BLD-MCNC: 12.7 G/DL (ref 12–16)
IMM GRANULOCYTES # BLD AUTO: 0.06 X10*3/UL (ref 0–0.7)
IMM GRANULOCYTES NFR BLD AUTO: 0.5 % (ref 0–0.9)
KETONES UR STRIP.AUTO-MCNC: NEGATIVE MG/DL
LACTATE SERPL-SCNC: 1.3 MMOL/L (ref 0.4–2)
LEUKOCYTE ESTERASE UR QL STRIP.AUTO: NEGATIVE
LIPASE SERPL-CCNC: 10 U/L (ref 9–82)
LYMPHOCYTES # BLD AUTO: 0.59 X10*3/UL (ref 1.2–4.8)
LYMPHOCYTES NFR BLD AUTO: 5.2 %
MAGNESIUM SERPL-MCNC: 1.78 MG/DL (ref 1.6–2.4)
MCH RBC QN AUTO: 33 PG (ref 26–34)
MCHC RBC AUTO-ENTMCNC: 34.6 G/DL (ref 32–36)
MCV RBC AUTO: 95 FL (ref 80–100)
MONOCYTES # BLD AUTO: 0.56 X10*3/UL (ref 0.1–1)
MONOCYTES NFR BLD AUTO: 4.9 %
NEUTROPHILS # BLD AUTO: 10.08 X10*3/UL (ref 1.2–7.7)
NEUTROPHILS NFR BLD AUTO: 88.8 %
NITRITE UR QL STRIP.AUTO: NEGATIVE
NRBC BLD-RTO: 0 /100 WBCS (ref 0–0)
P AXIS: 21 DEGREES
P OFFSET: 184 MS
P ONSET: 131 MS
PH UR STRIP.AUTO: 7 [PH]
PLATELET # BLD AUTO: 245 X10*3/UL (ref 150–450)
POTASSIUM SERPL-SCNC: 3.3 MMOL/L (ref 3.5–5.3)
PR INTERVAL: 174 MS
PROT SERPL-MCNC: 7.9 G/DL (ref 6.4–8.2)
PROT UR STRIP.AUTO-MCNC: NEGATIVE MG/DL
Q ONSET: 218 MS
QRS COUNT: 15 BEATS
QRS DURATION: 94 MS
QT INTERVAL: 354 MS
QTC CALCULATION(BAZETT): 440 MS
QTC FREDERICIA: 410 MS
R AXIS: -2 DEGREES
RBC # BLD AUTO: 3.85 X10*6/UL (ref 4–5.2)
RBC # UR STRIP.AUTO: NEGATIVE MG/DL
SODIUM SERPL-SCNC: 135 MMOL/L (ref 136–145)
SP GR UR STRIP.AUTO: 1.02
T AXIS: 16 DEGREES
T OFFSET: 395 MS
UROBILINOGEN UR STRIP.AUTO-MCNC: NORMAL MG/DL
VENTRICULAR RATE: 93 BPM
WBC # BLD AUTO: 11.4 X10*3/UL (ref 4.4–11.3)

## 2025-07-09 PROCEDURE — 83735 ASSAY OF MAGNESIUM: CPT

## 2025-07-09 PROCEDURE — 2500000004 HC RX 250 GENERAL PHARMACY W/ HCPCS (ALT 636 FOR OP/ED)

## 2025-07-09 PROCEDURE — 99285 EMERGENCY DEPT VISIT HI MDM: CPT | Mod: 25 | Performed by: EMERGENCY MEDICINE

## 2025-07-09 PROCEDURE — 85025 COMPLETE CBC W/AUTO DIFF WBC: CPT

## 2025-07-09 PROCEDURE — 1210000001 HC SEMI-PRIVATE ROOM DAILY

## 2025-07-09 PROCEDURE — 96374 THER/PROPH/DIAG INJ IV PUSH: CPT

## 2025-07-09 PROCEDURE — 83690 ASSAY OF LIPASE: CPT

## 2025-07-09 PROCEDURE — 93005 ELECTROCARDIOGRAM TRACING: CPT

## 2025-07-09 PROCEDURE — 83605 ASSAY OF LACTIC ACID: CPT

## 2025-07-09 PROCEDURE — 74177 CT ABD & PELVIS W/CONTRAST: CPT

## 2025-07-09 PROCEDURE — 2550000001 HC RX 255 CONTRASTS

## 2025-07-09 PROCEDURE — 84484 ASSAY OF TROPONIN QUANT: CPT

## 2025-07-09 PROCEDURE — 2500000004 HC RX 250 GENERAL PHARMACY W/ HCPCS (ALT 636 FOR OP/ED): Performed by: NURSE PRACTITIONER

## 2025-07-09 PROCEDURE — 36415 COLL VENOUS BLD VENIPUNCTURE: CPT

## 2025-07-09 PROCEDURE — 81003 URINALYSIS AUTO W/O SCOPE: CPT

## 2025-07-09 PROCEDURE — 2500000001 HC RX 250 WO HCPCS SELF ADMINISTERED DRUGS (ALT 637 FOR MEDICARE OP): Performed by: REGISTERED NURSE

## 2025-07-09 PROCEDURE — 2500000001 HC RX 250 WO HCPCS SELF ADMINISTERED DRUGS (ALT 637 FOR MEDICARE OP)

## 2025-07-09 PROCEDURE — 84075 ASSAY ALKALINE PHOSPHATASE: CPT

## 2025-07-09 PROCEDURE — 74177 CT ABD & PELVIS W/CONTRAST: CPT | Performed by: STUDENT IN AN ORGANIZED HEALTH CARE EDUCATION/TRAINING PROGRAM

## 2025-07-09 PROCEDURE — 2500000004 HC RX 250 GENERAL PHARMACY W/ HCPCS (ALT 636 FOR OP/ED): Performed by: REGISTERED NURSE

## 2025-07-09 PROCEDURE — 99222 1ST HOSP IP/OBS MODERATE 55: CPT | Performed by: NURSE PRACTITIONER

## 2025-07-09 RX ORDER — PANTOPRAZOLE SODIUM 40 MG/1
40 TABLET, DELAYED RELEASE ORAL
Status: DISCONTINUED | OUTPATIENT
Start: 2025-07-10 | End: 2025-07-10 | Stop reason: HOSPADM

## 2025-07-09 RX ORDER — PHENOBARBITAL 32.4 MG/1
1.8 TABLET ORAL
Status: ACTIVE | OUTPATIENT
Start: 2025-07-10 | End: 2025-07-10

## 2025-07-09 RX ORDER — FOLIC ACID 1 MG/1
1 TABLET ORAL DAILY
Status: DISCONTINUED | OUTPATIENT
Start: 2025-07-10 | End: 2025-07-10 | Stop reason: HOSPADM

## 2025-07-09 RX ORDER — MULTIVIT-MIN/IRON FUM/FOLIC AC 7.5 MG-4
1 TABLET ORAL DAILY
Status: DISCONTINUED | OUTPATIENT
Start: 2025-07-10 | End: 2025-07-10 | Stop reason: HOSPADM

## 2025-07-09 RX ORDER — SODIUM CHLORIDE, SODIUM LACTATE, POTASSIUM CHLORIDE, CALCIUM CHLORIDE 600; 310; 30; 20 MG/100ML; MG/100ML; MG/100ML; MG/100ML
75 INJECTION, SOLUTION INTRAVENOUS CONTINUOUS
Status: DISCONTINUED | OUTPATIENT
Start: 2025-07-09 | End: 2025-07-10 | Stop reason: HOSPADM

## 2025-07-09 RX ORDER — LORAZEPAM 1 MG/1
1 TABLET ORAL EVERY 2 HOUR PRN
Status: DISCONTINUED | OUTPATIENT
Start: 2025-07-09 | End: 2025-07-10 | Stop reason: HOSPADM

## 2025-07-09 RX ORDER — ONDANSETRON HYDROCHLORIDE 2 MG/ML
4 INJECTION, SOLUTION INTRAVENOUS EVERY 8 HOURS PRN
Status: DISCONTINUED | OUTPATIENT
Start: 2025-07-09 | End: 2025-07-10 | Stop reason: HOSPADM

## 2025-07-09 RX ORDER — PANTOPRAZOLE SODIUM 40 MG/10ML
40 INJECTION, POWDER, LYOPHILIZED, FOR SOLUTION INTRAVENOUS
Status: DISCONTINUED | OUTPATIENT
Start: 2025-07-10 | End: 2025-07-10 | Stop reason: HOSPADM

## 2025-07-09 RX ORDER — OXYCODONE HYDROCHLORIDE 5 MG/1
7.5 TABLET ORAL EVERY 4 HOURS PRN
Status: DISCONTINUED | OUTPATIENT
Start: 2025-07-09 | End: 2025-07-10 | Stop reason: HOSPADM

## 2025-07-09 RX ORDER — ONDANSETRON 4 MG/1
4 TABLET, FILM COATED ORAL EVERY 8 HOURS PRN
Status: DISCONTINUED | OUTPATIENT
Start: 2025-07-09 | End: 2025-07-10 | Stop reason: HOSPADM

## 2025-07-09 RX ORDER — POTASSIUM CHLORIDE 1.5 G/1.58G
40 POWDER, FOR SOLUTION ORAL ONCE
Status: COMPLETED | OUTPATIENT
Start: 2025-07-09 | End: 2025-07-09

## 2025-07-09 RX ORDER — LORAZEPAM 1 MG/1
2 TABLET ORAL EVERY 2 HOUR PRN
Status: DISCONTINUED | OUTPATIENT
Start: 2025-07-09 | End: 2025-07-10 | Stop reason: HOSPADM

## 2025-07-09 RX ORDER — PHENOBARBITAL 32.4 MG/1
2.4 TABLET ORAL ONCE
Status: DISCONTINUED | OUTPATIENT
Start: 2025-07-09 | End: 2025-07-10 | Stop reason: HOSPADM

## 2025-07-09 RX ORDER — ENOXAPARIN SODIUM 100 MG/ML
40 INJECTION SUBCUTANEOUS EVERY 24 HOURS
Status: DISCONTINUED | OUTPATIENT
Start: 2025-07-09 | End: 2025-07-10 | Stop reason: HOSPADM

## 2025-07-09 RX ORDER — POTASSIUM CHLORIDE 1.5 G/1.58G
40 POWDER, FOR SOLUTION ORAL 2 TIMES DAILY
Status: DISCONTINUED | OUTPATIENT
Start: 2025-07-09 | End: 2025-07-09

## 2025-07-09 RX ORDER — PHENOBARBITAL 32.4 MG/1
64.8 TABLET ORAL EVERY 6 HOURS PRN
Status: DISCONTINUED | OUTPATIENT
Start: 2025-07-09 | End: 2025-07-10 | Stop reason: HOSPADM

## 2025-07-09 RX ORDER — PHENOBARBITAL 32.4 MG/1
32.4 TABLET ORAL 3 TIMES DAILY
Status: DISCONTINUED | OUTPATIENT
Start: 2025-07-11 | End: 2025-07-10 | Stop reason: HOSPADM

## 2025-07-09 RX ORDER — DOCUSATE SODIUM 100 MG/1
100 CAPSULE, LIQUID FILLED ORAL 2 TIMES DAILY
Status: DISCONTINUED | OUTPATIENT
Start: 2025-07-09 | End: 2025-07-10 | Stop reason: HOSPADM

## 2025-07-09 RX ORDER — LORAZEPAM 0.5 MG/1
0.5 TABLET ORAL EVERY 2 HOUR PRN
Status: DISCONTINUED | OUTPATIENT
Start: 2025-07-09 | End: 2025-07-10 | Stop reason: HOSPADM

## 2025-07-09 RX ORDER — FOLIC ACID 1 MG/1
1 TABLET ORAL DAILY
Status: DISCONTINUED | OUTPATIENT
Start: 2025-07-10 | End: 2025-07-09

## 2025-07-09 RX ORDER — LANOLIN ALCOHOL/MO/W.PET/CERES
100 CREAM (GRAM) TOPICAL DAILY
Status: DISCONTINUED | OUTPATIENT
Start: 2025-07-10 | End: 2025-07-10 | Stop reason: HOSPADM

## 2025-07-09 RX ORDER — OXYCODONE HYDROCHLORIDE 5 MG/1
5 TABLET ORAL EVERY 4 HOURS PRN
Status: DISCONTINUED | OUTPATIENT
Start: 2025-07-09 | End: 2025-07-10 | Stop reason: HOSPADM

## 2025-07-09 RX ORDER — KETOROLAC TROMETHAMINE 30 MG/ML
15 INJECTION, SOLUTION INTRAMUSCULAR; INTRAVENOUS EVERY 6 HOURS PRN
Status: DISCONTINUED | OUTPATIENT
Start: 2025-07-09 | End: 2025-07-09

## 2025-07-09 RX ORDER — KETOROLAC TROMETHAMINE 30 MG/ML
15 INJECTION, SOLUTION INTRAMUSCULAR; INTRAVENOUS EVERY 6 HOURS PRN
Status: DISCONTINUED | OUTPATIENT
Start: 2025-07-09 | End: 2025-07-10 | Stop reason: HOSPADM

## 2025-07-09 RX ORDER — MULTIVIT-MIN/IRON FUM/FOLIC AC 7.5 MG-4
1 TABLET ORAL DAILY
Status: DISCONTINUED | OUTPATIENT
Start: 2025-07-10 | End: 2025-07-09

## 2025-07-09 RX ORDER — PHENOBARBITAL 32.4 MG/1
64.8 TABLET ORAL 3 TIMES DAILY
Status: DISCONTINUED | OUTPATIENT
Start: 2025-07-09 | End: 2025-07-10 | Stop reason: HOSPADM

## 2025-07-09 RX ADMIN — ENOXAPARIN SODIUM 40 MG: 100 INJECTION SUBCUTANEOUS at 22:03

## 2025-07-09 RX ADMIN — SODIUM CHLORIDE 1000 ML: 0.9 INJECTION, SOLUTION INTRAVENOUS at 15:47

## 2025-07-09 RX ADMIN — IOHEXOL 75 ML: 350 INJECTION, SOLUTION INTRAVENOUS at 16:19

## 2025-07-09 RX ADMIN — KETOROLAC TROMETHAMINE 15 MG: 30 INJECTION, SOLUTION INTRAMUSCULAR at 22:03

## 2025-07-09 RX ADMIN — PIPERACILLIN SODIUM AND TAZOBACTAM SODIUM 3.38 G: 3; .375 INJECTION, SOLUTION INTRAVENOUS at 23:47

## 2025-07-09 RX ADMIN — POTASSIUM CHLORIDE 40 MEQ: 1.5 POWDER, FOR SOLUTION ORAL at 18:05

## 2025-07-09 RX ADMIN — DOCUSATE SODIUM 100 MG: 100 CAPSULE, LIQUID FILLED ORAL at 22:03

## 2025-07-09 RX ADMIN — PIPERACILLIN AND TAZOBACTAM 4.5 G: 4; .5 INJECTION, POWDER, FOR SOLUTION INTRAVENOUS at 18:06

## 2025-07-09 SDOH — HEALTH STABILITY: PHYSICAL HEALTH
HOW OFTEN DO YOU NEED TO HAVE SOMEONE HELP YOU WHEN YOU READ INSTRUCTIONS, PAMPHLETS, OR OTHER WRITTEN MATERIAL FROM YOUR DOCTOR OR PHARMACY?: SOMETIMES

## 2025-07-09 SDOH — SOCIAL STABILITY: SOCIAL INSECURITY: WITHIN THE LAST YEAR, HAVE YOU BEEN HUMILIATED OR EMOTIONALLY ABUSED IN OTHER WAYS BY YOUR PARTNER OR EX-PARTNER?: NO

## 2025-07-09 SDOH — SOCIAL STABILITY: SOCIAL NETWORK: IN A TYPICAL WEEK, HOW MANY TIMES DO YOU TALK ON THE PHONE WITH FAMILY, FRIENDS, OR NEIGHBORS?: PATIENT DECLINED

## 2025-07-09 SDOH — ECONOMIC STABILITY: FOOD INSECURITY
WITHIN THE PAST 12 MONTHS, YOU WORRIED THAT YOUR FOOD WOULD RUN OUT BEFORE YOU GOT THE MONEY TO BUY MORE.: PATIENT DECLINED

## 2025-07-09 SDOH — SOCIAL STABILITY: SOCIAL INSECURITY
WITHIN THE LAST YEAR, HAVE YOU BEEN RAPED OR FORCED TO HAVE ANY KIND OF SEXUAL ACTIVITY BY YOUR PARTNER OR EX-PARTNER?: NO

## 2025-07-09 SDOH — HEALTH STABILITY: PHYSICAL HEALTH
ON AVERAGE, HOW MANY DAYS PER WEEK DO YOU ENGAGE IN MODERATE TO STRENUOUS EXERCISE (LIKE A BRISK WALK)?: PATIENT DECLINED

## 2025-07-09 SDOH — ECONOMIC STABILITY: INCOME INSECURITY
IN THE PAST 12 MONTHS HAS THE ELECTRIC, GAS, OIL, OR WATER COMPANY THREATENED TO SHUT OFF SERVICES IN YOUR HOME?: PATIENT DECLINED

## 2025-07-09 SDOH — HEALTH STABILITY: PHYSICAL HEALTH: ON AVERAGE, HOW MANY MINUTES DO YOU ENGAGE IN EXERCISE AT THIS LEVEL?: PATIENT DECLINED

## 2025-07-09 SDOH — SOCIAL STABILITY: SOCIAL INSECURITY
WITHIN THE LAST YEAR, HAVE YOU BEEN KICKED, HIT, SLAPPED, OR OTHERWISE PHYSICALLY HURT BY YOUR PARTNER OR EX-PARTNER?: NO

## 2025-07-09 SDOH — ECONOMIC STABILITY: FOOD INSECURITY: HOW HARD IS IT FOR YOU TO PAY FOR THE VERY BASICS LIKE FOOD, HOUSING, MEDICAL CARE, AND HEATING?: PATIENT DECLINED

## 2025-07-09 SDOH — SOCIAL STABILITY: SOCIAL INSECURITY: HAVE YOU HAD ANY THOUGHTS OF HARMING ANYONE ELSE?: NO

## 2025-07-09 SDOH — SOCIAL STABILITY: SOCIAL INSECURITY: ARE THERE ANY APPARENT SIGNS OF INJURIES/BEHAVIORS THAT COULD BE RELATED TO ABUSE/NEGLECT?: NO

## 2025-07-09 SDOH — SOCIAL STABILITY: SOCIAL INSECURITY: HAVE YOU HAD THOUGHTS OF HARMING ANYONE ELSE?: NO

## 2025-07-09 SDOH — ECONOMIC STABILITY: FOOD INSECURITY: WITHIN THE PAST 12 MONTHS, THE FOOD YOU BOUGHT JUST DIDN'T LAST AND YOU DIDN'T HAVE MONEY TO GET MORE.: PATIENT DECLINED

## 2025-07-09 SDOH — SOCIAL STABILITY: SOCIAL INSECURITY: WITHIN THE LAST YEAR, HAVE YOU BEEN AFRAID OF YOUR PARTNER OR EX-PARTNER?: NO

## 2025-07-09 SDOH — SOCIAL STABILITY: SOCIAL NETWORK: HOW OFTEN DO YOU ATTEND CHURCH OR RELIGIOUS SERVICES?: PATIENT DECLINED

## 2025-07-09 SDOH — HEALTH STABILITY: MENTAL HEALTH
DO YOU FEEL STRESS - TENSE, RESTLESS, NERVOUS, OR ANXIOUS, OR UNABLE TO SLEEP AT NIGHT BECAUSE YOUR MIND IS TROUBLED ALL THE TIME - THESE DAYS?: PATIENT DECLINED

## 2025-07-09 SDOH — SOCIAL STABILITY: SOCIAL NETWORK
DO YOU BELONG TO ANY CLUBS OR ORGANIZATIONS SUCH AS CHURCH GROUPS, UNIONS, FRATERNAL OR ATHLETIC GROUPS, OR SCHOOL GROUPS?: PATIENT DECLINED

## 2025-07-09 SDOH — ECONOMIC STABILITY: HOUSING INSECURITY: IN THE PAST 12 MONTHS, HOW MANY TIMES HAVE YOU MOVED WHERE YOU WERE LIVING?: 0

## 2025-07-09 SDOH — ECONOMIC STABILITY: HOUSING INSECURITY: AT ANY TIME IN THE PAST 12 MONTHS, WERE YOU HOMELESS OR LIVING IN A SHELTER (INCLUDING NOW)?: PATIENT DECLINED

## 2025-07-09 SDOH — SOCIAL STABILITY: SOCIAL NETWORK: HOW OFTEN DO YOU ATTEND MEETINGS OF THE CLUBS OR ORGANIZATIONS YOU BELONG TO?: PATIENT DECLINED

## 2025-07-09 SDOH — ECONOMIC STABILITY: TRANSPORTATION INSECURITY
IN THE PAST 12 MONTHS, HAS LACK OF TRANSPORTATION KEPT YOU FROM MEDICAL APPOINTMENTS OR FROM GETTING MEDICATIONS?: PATIENT DECLINED

## 2025-07-09 SDOH — SOCIAL STABILITY: SOCIAL INSECURITY: DO YOU FEEL ANYONE HAS EXPLOITED OR TAKEN ADVANTAGE OF YOU FINANCIALLY OR OF YOUR PERSONAL PROPERTY?: NO

## 2025-07-09 SDOH — SOCIAL STABILITY: SOCIAL INSECURITY: WERE YOU ABLE TO COMPLETE ALL THE BEHAVIORAL HEALTH SCREENINGS?: YES

## 2025-07-09 SDOH — SOCIAL STABILITY: SOCIAL INSECURITY: ABUSE: ADULT

## 2025-07-09 SDOH — SOCIAL STABILITY: SOCIAL INSECURITY: HAS ANYONE EVER THREATENED TO HURT YOUR FAMILY OR YOUR PETS?: NO

## 2025-07-09 SDOH — SOCIAL STABILITY: SOCIAL INSECURITY: ARE YOU OR HAVE YOU BEEN THREATENED OR ABUSED PHYSICALLY, EMOTIONALLY, OR SEXUALLY BY ANYONE?: NO

## 2025-07-09 SDOH — ECONOMIC STABILITY: HOUSING INSECURITY: IN THE LAST 12 MONTHS, WAS THERE A TIME WHEN YOU WERE NOT ABLE TO PAY THE MORTGAGE OR RENT ON TIME?: PATIENT DECLINED

## 2025-07-09 SDOH — SOCIAL STABILITY: SOCIAL INSECURITY: ARE YOU MARRIED, WIDOWED, DIVORCED, SEPARATED, NEVER MARRIED, OR LIVING WITH A PARTNER?: PATIENT DECLINED

## 2025-07-09 SDOH — SOCIAL STABILITY: SOCIAL NETWORK: HOW OFTEN DO YOU GET TOGETHER WITH FRIENDS OR RELATIVES?: PATIENT DECLINED

## 2025-07-09 SDOH — SOCIAL STABILITY: SOCIAL INSECURITY: DO YOU FEEL UNSAFE GOING BACK TO THE PLACE WHERE YOU ARE LIVING?: NO

## 2025-07-09 SDOH — SOCIAL STABILITY: SOCIAL INSECURITY: DOES ANYONE TRY TO KEEP YOU FROM HAVING/CONTACTING OTHER FRIENDS OR DOING THINGS OUTSIDE YOUR HOME?: NO

## 2025-07-09 ASSESSMENT — LIFESTYLE VARIABLES
AUDIT TOTAL SCORE: 6
HOW OFTEN DO YOU HAVE 6 OR MORE DRINKS ON ONE OCCASION: MONTHLY
AUDIT-C TOTAL SCORE: 6
EVER FELT BAD OR GUILTY ABOUT YOUR DRINKING: NO
SKIP TO QUESTIONS 9-10: 0
HAS A RELATIVE, FRIEND, DOCTOR, OR ANOTHER HEALTH PROFESSIONAL EXPRESSED CONCERN ABOUT YOUR DRINKING OR SUGGESTED YOU CUT DOWN: NO
AUDIT-C TOTAL SCORE: 6
HOW OFTEN DURING THE LAST YEAR HAVE YOU HAD A FEELING OF GUILT OR REMORSE AFTER DRINKING: NEVER
HAVE PEOPLE ANNOYED YOU BY CRITICIZING YOUR DRINKING: NO
PRESCIPTION_ABUSE_PAST_12_MONTHS: NO
HOW OFTEN DURING THE LAST YEAR HAVE YOU NEEDED AN ALCOHOLIC DRINK FIRST THING IN THE MORNING TO GET YOURSELF GOING AFTER A NIGHT OF HEAVY DRINKING: NEVER
HOW OFTEN DURING THE LAST YEAR HAVE YOU BEEN UNABLE TO REMEMBER WHAT HAPPENED THE NIGHT BEFORE BECAUSE YOU HAD BEEN DRINKING: NEVER
HOW OFTEN DURING THE LAST YEAR HAVE YOU FAILED TO DO WHAT WAS NORMALLY EXPECTED FROM YOU BECAUSE OF DRINKING: NEVER
HOW OFTEN DO YOU HAVE A DRINK CONTAINING ALCOHOL: 2-3 TIMES A WEEK
HAVE YOU EVER FELT YOU SHOULD CUT DOWN ON YOUR DRINKING: NO
HOW OFTEN DURING THE LAST YEAR HAVE YOU FOUND THAT YOU WERE NOT ABLE TO STOP DRINKING ONCE YOU HAD STARTED: NEVER
AUDIT TOTAL SCORE: 0
EVER HAD A DRINK FIRST THING IN THE MORNING TO STEADY YOUR NERVES TO GET RID OF A HANGOVER: NO
TOTAL SCORE: 0
HAVE YOU OR SOMEONE ELSE BEEN INJURED AS A RESULT OF YOUR DRINKING: NO
SUBSTANCE_ABUSE_PAST_12_MONTHS: NO
HOW MANY STANDARD DRINKS CONTAINING ALCOHOL DO YOU HAVE ON A TYPICAL DAY: 3 OR 4

## 2025-07-09 ASSESSMENT — ACTIVITIES OF DAILY LIVING (ADL)
HEARING - RIGHT EAR: FUNCTIONAL
HEARING - LEFT EAR: FUNCTIONAL
DRESSING YOURSELF: INDEPENDENT
ASSISTIVE_DEVICE: EYEGLASSES
LACK_OF_TRANSPORTATION: PATIENT DECLINED
FEEDING YOURSELF: INDEPENDENT
JUDGMENT_ADEQUATE_SAFELY_COMPLETE_DAILY_ACTIVITIES: YES
BATHING: INDEPENDENT
PATIENT'S MEMORY ADEQUATE TO SAFELY COMPLETE DAILY ACTIVITIES?: YES
ADEQUATE_TO_COMPLETE_ADL: YES
GROOMING: INDEPENDENT
TOILETING: INDEPENDENT
LACK_OF_TRANSPORTATION: PATIENT DECLINED
WALKS IN HOME: INDEPENDENT

## 2025-07-09 ASSESSMENT — PAIN SCALES - GENERAL
PAINLEVEL_OUTOF10: 4
PAINLEVEL_OUTOF10: 6
PAINLEVEL_OUTOF10: 1
PAINLEVEL_OUTOF10: 5 - MODERATE PAIN

## 2025-07-09 ASSESSMENT — ENCOUNTER SYMPTOMS
VOICE CHANGE: 0
VOMITING: 0
ABDOMINAL DISTENTION: 0
ACTIVITY CHANGE: 0
PSYCHIATRIC NEGATIVE: 1
COUGH: 0
NAUSEA: 0
ABDOMINAL PAIN: 1
FEVER: 1
JOINT SWELLING: 0
DYSURIA: 0
TROUBLE SWALLOWING: 0
ENDOCRINE NEGATIVE: 1
DIARRHEA: 0
CHEST TIGHTNESS: 0
ARTHRALGIAS: 0
NEUROLOGICAL NEGATIVE: 1
DIFFICULTY URINATING: 0
EYES NEGATIVE: 1
SHORTNESS OF BREATH: 0

## 2025-07-09 ASSESSMENT — COGNITIVE AND FUNCTIONAL STATUS - GENERAL
MOBILITY SCORE: 24
PATIENT BASELINE BEDBOUND: NO
DAILY ACTIVITIY SCORE: 24

## 2025-07-09 ASSESSMENT — PAIN - FUNCTIONAL ASSESSMENT
PAIN_FUNCTIONAL_ASSESSMENT: 0-10

## 2025-07-09 ASSESSMENT — PAIN DESCRIPTION - DESCRIPTORS
DESCRIPTORS: PRESSURE;CRAMPING
DESCRIPTORS: ACHING;SORE;SPASM;TENDER

## 2025-07-09 ASSESSMENT — PATIENT HEALTH QUESTIONNAIRE - PHQ9
1. LITTLE INTEREST OR PLEASURE IN DOING THINGS: NOT AT ALL
SUM OF ALL RESPONSES TO PHQ9 QUESTIONS 1 & 2: 0
2. FEELING DOWN, DEPRESSED OR HOPELESS: NOT AT ALL

## 2025-07-09 ASSESSMENT — PAIN DESCRIPTION - LOCATION: LOCATION: ABDOMEN

## 2025-07-09 ASSESSMENT — PAIN DESCRIPTION - PAIN TYPE: TYPE: ACUTE PAIN

## 2025-07-09 ASSESSMENT — PAIN DESCRIPTION - ORIENTATION: ORIENTATION: LOWER

## 2025-07-09 NOTE — ED PROVIDER NOTES
HPI   Chief Complaint   Patient presents with    Abdominal Pain     Across lower abdomen. Described as pressure. Standing makes it worse, bumps in car, pain with urination. Soft/foul smelling BM. Last night body aches, chills, fever.        50-year-old female with past medical history significant for HTN, and hypothyroidism presents to the emergency department for evaluation of abdominal pain since yesterday.  Tells me her pain is predominantly lower abdomen over her bladder, feels like pressure, fluctuating in intensity, states it is worse with movement, to the touch and when she urinates.  Also tells me that when she touches other areas of her belly she feels discomfort over her bladder/lower abdomen.  She is also experiencing episodes where she feels warm and cold simultaneously and believes she had a fever.  Tells me that she took Tylenol this morning around 0900, believes that it helped with her fever however does not believe it has decreased her abdominal pain.  She denies dysuria, urinary frequency, abnormal vaginal discharge or other urinary symptoms.  States she does have occasional diarrhea at baseline, and last bowel movement was somewhat soft yesterday, has not had a bowel movement today.  However denies hematochezia, melena, constipation.  Denies chest pain, shortness of breath, nausea, vomiting, back or flank pain, calf pain.  Denies history of DVT/PE.  Denies any other concerns or symptoms at this time.  Does not believe there is a chance for STD, declines STD testing.  Denies history of abdominal surgeries.      History provided by:  Patient   used: No      Patient History   Medical History[1]  Surgical History[2]  Family History[3]  Social History[4]    Physical Exam   ED Triage Vitals [07/09/25 1455]   Temperature Heart Rate Respirations BP   37 °C (98.6 °F) 98 16 (!) 147/99      Pulse Ox Temp Source Heart Rate Source Patient Position   96 % Temporal -- --      BP Location FiO2  (%)     -- --       Physical Exam  Vitals and nursing note reviewed.   Constitutional:       General: She is not in acute distress.     Appearance: She is well-developed. She is obese. She is not ill-appearing, toxic-appearing or diaphoretic.   HENT:      Head: Normocephalic and atraumatic.   Eyes:      General: No scleral icterus.     Extraocular Movements: Extraocular movements intact.   Cardiovascular:      Rate and Rhythm: Normal rate and regular rhythm.      Heart sounds: Normal heart sounds. No murmur heard.     No friction rub. No gallop.   Pulmonary:      Effort: Pulmonary effort is normal. No respiratory distress.      Breath sounds: Normal breath sounds. No stridor. No wheezing, rhonchi or rales.   Chest:      Chest wall: No tenderness.   Abdominal:      General: Abdomen is flat. There is no distension.      Palpations: Abdomen is soft. There is no mass or pulsatile mass.      Tenderness: There is generalized abdominal tenderness and tenderness in the suprapubic area. There is no right CVA tenderness, left CVA tenderness, guarding or rebound.   Genitourinary:     Adnexa: Right adnexa normal and left adnexa normal.   Skin:     General: Skin is warm.      Capillary Refill: Capillary refill takes less than 2 seconds.      Coloration: Skin is not cyanotic, jaundiced, mottled or pale.      Findings: No erythema or rash.   Neurological:      General: No focal deficit present.      Mental Status: She is alert and oriented to person, place, and time.      Motor: No weakness.     ED Course & MDM   Diagnoses as of 07/09/25 1831   Suprapubic abdominal pain   Diverticulitis of intestine with perforation and bleeding without abscess, unspecified part of intestinal tract   Acute diverticulitis     Yahaira Coma Scale Score: 15 (07/09/25 1501 : Declan Al, DEANA)                       Medical Decision Making  Differential diagnoses considered but not limited to: UTI, pyelonephritis, diverticulitis, gastroenteritis,  colitis, obstruction    50-year-old female with past medical history significant for HTN, and hypothyroidism presents to the emergency department for evaluation of abdominal pain since yesterday.  On presentation hypertensive 147/99, afebrile, HR 98, RR 16, SpO2 96%.  Patient is nontoxic appearing, in no acute distress. Heart sounds normal with regular rate and rhythm.  Lungs clear to auscultation bilaterally with no adventitious sounds.  Abdomen is soft with generalized abdominal tenderness, however predominantly suprapubic with no rebound or guarding.  No CVA tenderness.  Negative Homans' sign bilaterally.  Will obtain basic labs, lactate, lipase UA, and troponin/EKG given some epigastric tenderness.  As well as CT abdomen pelvis with IV contrast.  Patient declined any pain medication at this time.    Magnesium, lipase, lactate unremarkable.  High-sensitivity troponin 3 with no evidence of acute ischemia on EKG given low suspicion for ACS.  UA unremarkable no evidence of urinary tract infection.  CMP with slight hyperglycemia 112, slight hyponatremia 135, hypokalemia 3.3, hypochloremia 96, patient was given 40 mEq p.o. potassium chloride, total bilirubin slightly elevated 1.3, otherwise unremarkable.  CBC with mild leukocytosis 11.4, mild anemia RBCs 3.85, normal H&H, ANC 10.08.  CT abdomen pelvis with IV contrast revealed acute sigmoid diverticulitis with small microperforations but no drainable abscess.  There also showed hepatomegaly and diffuse steatosis.  Given these findings I started patient on 4.5 g IV Zosyn and reached out to general surgeon Dr. De La Garza who accepted the patient to the hospital under his service for observation and IV antibiotics.  Patient was made aware of lab and imaging findings as well as suggested plan of care, she was verbally in agreement.  All questions and concerns were addressed and answered prior to admission.    Amount and/or Complexity of Data Reviewed  Labs: ordered.  Decision-making details documented in ED Course.  Radiology: ordered. Decision-making details documented in ED Course.  ECG/medicine tests: ordered.     Details: EKG at 1510, with ventricular rate of 93, as interpreted by me, shows a normal rate and rhythm, normal axis, MA interval 174, QRS 94, QT/QTc 350/440. And normal ST and T wave pattern with no evidence of acute ischemia or other acute findings.  Most recent previous EKG from 2007, however no significant changes.                 [1]   Past Medical History:  Diagnosis Date    Abnormal Pap smear of cervix     CTS (carpal tunnel syndrome)     Disease of thyroid gland     HPV (human papilloma virus) infection     Hypertension     Hypothyroidism     Plantar fasciitis     Tendinitis     Varicella    [2]   Past Surgical History:  Procedure Laterality Date    CERVICAL BIOPSY  W/ LOOP ELECTRODE EXCISION      COLPOSCOPY      OTHER SURGICAL HISTORY  10/12/2022    Corneal lasik    OTHER SURGICAL HISTORY  10/12/2022    [place] tooth extraction   [3]   Family History  Problem Relation Name Age of Onset    Breast cancer Sister Maddi     Drug abuse Sister Maddi     COPD Mother Maddi     Hearing loss Mother Maddi     Hypertension Mother Maddi     COPD Father Melvin     Hypertension Father Melvin     Alcohol abuse Sister Ghada     Autoimmune disease Sister Desire     Mental illness Sister Jennifer    [4]   Social History  Tobacco Use    Smoking status: Never    Smokeless tobacco: Never   Substance Use Topics    Alcohol use: Yes     Alcohol/week: 10.0 standard drinks of alcohol     Types: 10 Glasses of wine per week    Drug use: Never        Edilberto Aguilera PA-C  07/09/25 2019

## 2025-07-10 VITALS
DIASTOLIC BLOOD PRESSURE: 71 MMHG | BODY MASS INDEX: 34.25 KG/M2 | HEIGHT: 64 IN | TEMPERATURE: 97.7 F | SYSTOLIC BLOOD PRESSURE: 131 MMHG | RESPIRATION RATE: 16 BRPM | OXYGEN SATURATION: 94 % | HEART RATE: 79 BPM | WEIGHT: 200.62 LBS

## 2025-07-10 LAB
ANION GAP SERPL CALC-SCNC: 11 MMOL/L (ref 10–20)
BUN SERPL-MCNC: 9 MG/DL (ref 6–23)
CALCIUM SERPL-MCNC: 9.2 MG/DL (ref 8.6–10.3)
CHLORIDE SERPL-SCNC: 101 MMOL/L (ref 98–107)
CO2 SERPL-SCNC: 29 MMOL/L (ref 21–32)
CREAT SERPL-MCNC: 0.9 MG/DL (ref 0.5–1.05)
EGFRCR SERPLBLD CKD-EPI 2021: 78 ML/MIN/1.73M*2
ERYTHROCYTE [DISTWIDTH] IN BLOOD BY AUTOMATED COUNT: 13.8 % (ref 11.5–14.5)
GLUCOSE SERPL-MCNC: 101 MG/DL (ref 74–99)
HCT VFR BLD AUTO: 33.9 % (ref 36–46)
HGB BLD-MCNC: 11.5 G/DL (ref 12–16)
HOLD SPECIMEN: NORMAL
HOLD SPECIMEN: NORMAL
MCH RBC QN AUTO: 33.1 PG (ref 26–34)
MCHC RBC AUTO-ENTMCNC: 33.9 G/DL (ref 32–36)
MCV RBC AUTO: 98 FL (ref 80–100)
NRBC BLD-RTO: 0 /100 WBCS (ref 0–0)
PLATELET # BLD AUTO: 200 X10*3/UL (ref 150–450)
POTASSIUM SERPL-SCNC: 3.8 MMOL/L (ref 3.5–5.3)
RBC # BLD AUTO: 3.47 X10*6/UL (ref 4–5.2)
SODIUM SERPL-SCNC: 137 MMOL/L (ref 136–145)
WBC # BLD AUTO: 8 X10*3/UL (ref 4.4–11.3)

## 2025-07-10 PROCEDURE — 36415 COLL VENOUS BLD VENIPUNCTURE: CPT | Performed by: REGISTERED NURSE

## 2025-07-10 PROCEDURE — 2500000004 HC RX 250 GENERAL PHARMACY W/ HCPCS (ALT 636 FOR OP/ED)

## 2025-07-10 PROCEDURE — 2500000004 HC RX 250 GENERAL PHARMACY W/ HCPCS (ALT 636 FOR OP/ED): Performed by: REGISTERED NURSE

## 2025-07-10 PROCEDURE — 82565 ASSAY OF CREATININE: CPT | Performed by: REGISTERED NURSE

## 2025-07-10 PROCEDURE — 85027 COMPLETE CBC AUTOMATED: CPT | Performed by: REGISTERED NURSE

## 2025-07-10 PROCEDURE — 99231 SBSQ HOSP IP/OBS SF/LOW 25: CPT | Performed by: SURGERY

## 2025-07-10 PROCEDURE — 2500000001 HC RX 250 WO HCPCS SELF ADMINISTERED DRUGS (ALT 637 FOR MEDICARE OP): Performed by: REGISTERED NURSE

## 2025-07-10 PROCEDURE — 99238 HOSP IP/OBS DSCHRG MGMT 30/<: CPT | Performed by: REGISTERED NURSE

## 2025-07-10 PROCEDURE — 2500000002 HC RX 250 W HCPCS SELF ADMINISTERED DRUGS (ALT 637 FOR MEDICARE OP, ALT 636 FOR OP/ED): Performed by: REGISTERED NURSE

## 2025-07-10 PROCEDURE — 2500000001 HC RX 250 WO HCPCS SELF ADMINISTERED DRUGS (ALT 637 FOR MEDICARE OP): Performed by: NURSE PRACTITIONER

## 2025-07-10 RX ORDER — KETOROLAC TROMETHAMINE 10 MG/1
10 TABLET, FILM COATED ORAL EVERY 6 HOURS PRN
Qty: 20 TABLET | Refills: 0 | Status: SHIPPED | OUTPATIENT
Start: 2025-07-10

## 2025-07-10 RX ORDER — AMOXICILLIN AND CLAVULANATE POTASSIUM 875; 125 MG/1; MG/1
1 TABLET, FILM COATED ORAL 2 TIMES DAILY
Qty: 20 TABLET | Refills: 0 | Status: SHIPPED | OUTPATIENT
Start: 2025-07-10 | End: 2025-07-20

## 2025-07-10 RX ORDER — LEVOTHYROXINE SODIUM 50 UG/1
50 TABLET ORAL
Status: DISCONTINUED | OUTPATIENT
Start: 2025-07-10 | End: 2025-07-10 | Stop reason: HOSPADM

## 2025-07-10 RX ADMIN — LOSARTAN POTASSIUM: 100 TABLET, FILM COATED ORAL at 10:01

## 2025-07-10 RX ADMIN — Medication 1 TABLET: at 08:26

## 2025-07-10 RX ADMIN — PANTOPRAZOLE SODIUM 40 MG: 40 INJECTION, POWDER, FOR SOLUTION INTRAVENOUS at 06:10

## 2025-07-10 RX ADMIN — DOCUSATE SODIUM 100 MG: 100 CAPSULE, LIQUID FILLED ORAL at 08:25

## 2025-07-10 RX ADMIN — PIPERACILLIN SODIUM AND TAZOBACTAM SODIUM 3.38 G: 3; .375 INJECTION, SOLUTION INTRAVENOUS at 12:18

## 2025-07-10 RX ADMIN — PIPERACILLIN SODIUM AND TAZOBACTAM SODIUM 3.38 G: 3; .375 INJECTION, SOLUTION INTRAVENOUS at 06:10

## 2025-07-10 RX ADMIN — LEVOTHYROXINE SODIUM 50 MCG: 0.05 TABLET ORAL at 08:25

## 2025-07-10 RX ADMIN — Medication 100 MG: at 08:26

## 2025-07-10 RX ADMIN — FOLIC ACID 1 MG: 1 TABLET ORAL at 08:26

## 2025-07-10 ASSESSMENT — PAIN - FUNCTIONAL ASSESSMENT: PAIN_FUNCTIONAL_ASSESSMENT: 0-10

## 2025-07-10 ASSESSMENT — COGNITIVE AND FUNCTIONAL STATUS - GENERAL
DAILY ACTIVITIY SCORE: 24
MOBILITY SCORE: 24

## 2025-07-10 ASSESSMENT — PAIN SCALES - GENERAL: PAINLEVEL_OUTOF10: 2

## 2025-07-10 NOTE — CARE PLAN
Problem: Pain - Adult  Goal: Verbalizes/displays adequate comfort level or baseline comfort level  7/10/2025 1301 by Jennifer Ewing RN  Outcome: Adequate for Discharge  7/10/2025 0758 by Jennifer Ewnig RN  Flowsheets (Taken 7/10/2025 0758)  Verbalizes/displays adequate comfort level or baseline comfort level:   Encourage patient to monitor pain and request assistance   Administer analgesics based on type and severity of pain and evaluate response   Consider cultural and social influences on pain and pain management   Assess pain using appropriate pain scale   Implement non-pharmacological measures as appropriate and evaluate response   Notify Licensed Independent Practitioner if interventions unsuccessful or patient reports new pain     Problem: Safety - Adult  Goal: Free from fall injury  7/10/2025 1301 by Jennfier Ewing RN  Outcome: Adequate for Discharge  7/10/2025 0758 by Jennifer Ewing RN  Flowsheets (Taken 7/10/2025 0758)  Free from fall injury: Instruct family/caregiver on patient safety     Problem: Discharge Planning  Goal: Discharge to home or other facility with appropriate resources  7/10/2025 1301 by Jennifer Ewing RN  Outcome: Adequate for Discharge  7/10/2025 0758 by Jennifer Ewing RN  Flowsheets (Taken 7/10/2025 0758)  Discharge to home or other facility with appropriate resources:   Identify barriers to discharge with patient and caregiver   Identify discharge learning needs (meds, wound care, etc)   Refer to discharge planning if patient needs post-hospital services based on physician order or complex needs related to functional status, cognitive ability or social support system   Arrange for needed discharge resources and transportation as appropriate     Problem: Chronic Conditions and Co-morbidities  Goal: Patient's chronic conditions and co-morbidity symptoms are monitored and maintained or improved  7/10/2025 1301 by Jennifer Ewing RN  Outcome: Adequate for Discharge  7/10/2025  0758 by Jennifer Ewing RN  Flowsheets (Taken 7/10/2025 0758)  Care Plan - Patient's Chronic Conditions and Co-Morbidity Symptoms are Monitored and Maintained or Improved:   Monitor and assess patient's chronic conditions and comorbid symptoms for stability, deterioration, or improvement   Collaborate with multidisciplinary team to address chronic and comorbid conditions and prevent exacerbation or deterioration   Update acute care plan with appropriate goals if chronic or comorbid symptoms are exacerbated and prevent overall improvement and discharge     Problem: Nutrition  Goal: Nutrient intake appropriate for maintaining nutritional needs  Outcome: Adequate for Discharge   The patient's goals for the shift include to have pain controlled    The clinical goals for the shift include Patient's pain will be managed to a tolerable level throughout this shift.    Over the shift, the patient did make progress adequate for discharge toward care plan goals.

## 2025-07-10 NOTE — DISCHARGE SUMMARY
Discharge Diagnosis  Acute diverticulitis    Issues Requiring Follow-Up  Call to kaylan an appointment with Dr. De La Garza in next month to discuss options with diverticulitis     Test Results Pending At Discharge  Pending Labs       No current pending labs.            Hospital Course  Patient is a 50-year-old female who presented to the emergency room due to abdominal cramping of lower abdomen associated with a low-grade fever.  CTAP was obtained and showed acute diverticulitis so patient was admitted under general surgery for further management.  Patient was started on IV antibiotics, IV fluids, clear liquid diet.  Patient states that she is feeling much better pain is tolerable and she would like to go home.  Patient's diet was advanced to low fiber and which she tolerated for lunch.  Patient will be sent home today in stable condition on p.o. Augmentin for 10 days.    Visit Vitals  /71   Pulse 79   Temp 36.5 °C (97.7 °F)   Resp 16     Vitals:    07/09/25 1900   Weight: 91 kg (200 lb 9.9 oz)         There is no immunization history on file for this patient.    Results  Results for orders placed or performed during the hospital encounter of 07/09/25 (from the past 24 hours)   ECG 12 lead   Result Value Ref Range    Ventricular Rate 93 BPM    Atrial Rate 93 BPM    NM Interval 174 ms    QRS Duration 94 ms    QT Interval 354 ms    QTC Calculation(Bazett) 440 ms    P Axis 21 degrees    R Axis -2 degrees    T Axis 16 degrees    QRS Count 15 beats    Q Onset 218 ms    P Onset 131 ms    P Offset 184 ms    T Offset 395 ms    QTC Fredericia 410 ms   CBC and Auto Differential   Result Value Ref Range    WBC 11.4 (H) 4.4 - 11.3 x10*3/uL    nRBC 0.0 0.0 - 0.0 /100 WBCs    RBC 3.85 (L) 4.00 - 5.20 x10*6/uL    Hemoglobin 12.7 12.0 - 16.0 g/dL    Hematocrit 36.7 36.0 - 46.0 %    MCV 95 80 - 100 fL    MCH 33.0 26.0 - 34.0 pg    MCHC 34.6 32.0 - 36.0 g/dL    RDW 13.7 11.5 - 14.5 %    Platelets 245 150 - 450 x10*3/uL     Neutrophils % 88.8 40.0 - 80.0 %    Immature Granulocytes %, Automated 0.5 0.0 - 0.9 %    Lymphocytes % 5.2 13.0 - 44.0 %    Monocytes % 4.9 2.0 - 10.0 %    Eosinophils % 0.2 0.0 - 6.0 %    Basophils % 0.4 0.0 - 2.0 %    Neutrophils Absolute 10.08 (H) 1.20 - 7.70 x10*3/uL    Immature Granulocytes Absolute, Automated 0.06 0.00 - 0.70 x10*3/uL    Lymphocytes Absolute 0.59 (L) 1.20 - 4.80 x10*3/uL    Monocytes Absolute 0.56 0.10 - 1.00 x10*3/uL    Eosinophils Absolute 0.02 0.00 - 0.70 x10*3/uL    Basophils Absolute 0.05 0.00 - 0.10 x10*3/uL   Magnesium   Result Value Ref Range    Magnesium 1.78 1.60 - 2.40 mg/dL   Comprehensive metabolic panel   Result Value Ref Range    Glucose 112 (H) 74 - 99 mg/dL    Sodium 135 (L) 136 - 145 mmol/L    Potassium 3.3 (L) 3.5 - 5.3 mmol/L    Chloride 96 (L) 98 - 107 mmol/L    Bicarbonate 28 21 - 32 mmol/L    Anion Gap 14 10 - 20 mmol/L    Urea Nitrogen 9 6 - 23 mg/dL    Creatinine 0.74 0.50 - 1.05 mg/dL    eGFR >90 >60 mL/min/1.73m*2    Calcium 9.8 8.6 - 10.3 mg/dL    Albumin 4.6 3.4 - 5.0 g/dL    Alkaline Phosphatase 71 33 - 110 U/L    Total Protein 7.9 6.4 - 8.2 g/dL    AST 15 9 - 39 U/L    Bilirubin, Total 1.3 (H) 0.0 - 1.2 mg/dL    ALT 23 7 - 45 U/L   Lipase   Result Value Ref Range    Lipase 10 9 - 82 U/L   Lactate   Result Value Ref Range    Lactate 1.3 0.4 - 2.0 mmol/L   Troponin I, High Sensitivity   Result Value Ref Range    Troponin I, High Sensitivity 3 0 - 13 ng/L   Urinalysis with Reflex Culture and Microscopic   Result Value Ref Range    Color, Urine Colorless (N) Light-Yellow, Yellow, Dark-Yellow    Appearance, Urine Clear Clear    Specific Gravity, Urine 1.021 1.005 - 1.035    pH, Urine 7.0 5.0, 5.5, 6.0, 6.5, 7.0, 7.5, 8.0    Protein, Urine NEGATIVE NEGATIVE, 10 (TRACE), 20 (TRACE) mg/dL    Glucose, Urine Normal Normal mg/dL    Blood, Urine NEGATIVE NEGATIVE mg/dL    Ketones, Urine NEGATIVE NEGATIVE mg/dL    Bilirubin, Urine NEGATIVE NEGATIVE mg/dL    Urobilinogen,  Urine Normal Normal mg/dL    Nitrite, Urine NEGATIVE NEGATIVE    Leukocyte Esterase, Urine NEGATIVE NEGATIVE   Extra Urine Gray Tube   Result Value Ref Range    Extra Tube     CBC   Result Value Ref Range    WBC 8.0 4.4 - 11.3 x10*3/uL    nRBC 0.0 0.0 - 0.0 /100 WBCs    RBC 3.47 (L) 4.00 - 5.20 x10*6/uL    Hemoglobin 11.5 (L) 12.0 - 16.0 g/dL    Hematocrit 33.9 (L) 36.0 - 46.0 %    MCV 98 80 - 100 fL    MCH 33.1 26.0 - 34.0 pg    MCHC 33.9 32.0 - 36.0 g/dL    RDW 13.8 11.5 - 14.5 %    Platelets 200 150 - 450 x10*3/uL   Basic metabolic panel   Result Value Ref Range    Glucose 101 (H) 74 - 99 mg/dL    Sodium 137 136 - 145 mmol/L    Potassium 3.8 3.5 - 5.3 mmol/L    Chloride 101 98 - 107 mmol/L    Bicarbonate 29 21 - 32 mmol/L    Anion Gap 11 10 - 20 mmol/L    Urea Nitrogen 9 6 - 23 mg/dL    Creatinine 0.90 0.50 - 1.05 mg/dL    eGFR 78 >60 mL/min/1.73m*2    Calcium 9.2 8.6 - 10.3 mg/dL   SST TOP   Result Value Ref Range    Extra Tube Hold for add-ons.      Pertinent Physical Exam At Time of Discharge  Physical Exam  See progress note from today    Home Medications     Medication List      START taking these medications     amoxicillin-clavulanate 875-125 mg tablet; Commonly known as: Augmentin;   Take 1 tablet by mouth 2 times a day for 10 days.   ketorolac 10 mg tablet; Commonly known as: Toradol; Take 1 tablet (10   mg) by mouth every 6 hours if needed for moderate pain (4 - 6).     CONTINUE taking these medications     levothyroxine 50 mcg tablet; Commonly known as: Synthroid, Levoxyl   losartan-hydrochlorothiazide 100-12.5 mg tablet; Commonly known as:   Hyzaar       Outpatient Follow-Up  No future appointments.    Time spent  20  minutes obtaining labs, imaging, recommendations, interview, assessment, examination, medication review/ordering, and EMR review.    Plan of care was discussed extensively with patient. Patient verbalized understanding through teach back method. All questions and concerns addressed upon  examination.     Of note, this documentation is completed using the Dragon Dictation system (voice recognition software). There may be spelling and/or grammatical errors that were not corrected prior to final submission.     KEIKO Quispe-CNP

## 2025-07-10 NOTE — SIGNIFICANT EVENT
Nurse reported that patient admitted to drinking multiple drinks throughout the week. Phenobarb taper ordered however patient is refusing this medication at this time.     She is also refusing a second IV be started to administered IV antibiotics because zosyn is not compatible for lactated ringers. Nurse may hold the IV fluids and run zosyn then resume fluids.

## 2025-07-10 NOTE — DISCHARGE INSTRUCTIONS
Call to kaylan an appointment with Dr. De La Garza in next month to discuss options with diverticulitis     Continue a low fiber diet for the next 5-7 days while slowly incorporating your normal daily diet back in    Call office or come to ER if:  You have a fever of 100.4 Fahrenheit  You cannot tolerate food or water  Unable to urinate  New or worsening of symptoms  Chest pain or shortness of breath

## 2025-07-10 NOTE — H&P
General Surgery History and Physical  Patient: Nita Miller  Unit/Bed: 1123/1123-A  YOB: 1975  MRN: [id number]  Acct: [id number]   Admitting Diagnosis: Suprapubic abdominal pain [R10.2]  Acute diverticulitis [K57.92]  Diverticulitis of intestine with perforation and bleeding without abscess, unspecified part of intestinal tract [K57.81]  Date:  7/9/2025  Hospital Day: 0  Attending: Gigi De La Garza MD    Complaint:  Chief Complaint   Patient presents with    Abdominal Pain     Across lower abdomen. Described as pressure. Standing makes it worse, bumps in car, pain with urination. Soft/foul smelling BM. Last night body aches, chills, fever.       History of Present Illness:  Nita Miller is a 50 year old female patient per chart review with past medical history of HTN and hypothyroidism that presented to [facility] Emergency Room Department with complaints of generalized lowe abdominal cramping that started yesterday at 1400 with low grade fever. Her initial work up in emergency room showed patient to have acute diverticulitis and patient will be admitted to our service for IV hydration and IV antibiotics.   Allergies:  RX Allergies[1]   PMHx:  Medical History[2]  PSHx:  Surgical History[3]  Social Hx:  Social History[4]  Family Hx:  Family History[5]  Review of Systems:   Review of Systems   Constitutional:  Positive for fever. Negative for activity change.   HENT:  Negative for congestion, ear pain, trouble swallowing and voice change.    Eyes: Negative.    Respiratory:  Negative for cough, chest tightness and shortness of breath.    Cardiovascular:  Negative for chest pain.   Gastrointestinal:  Positive for abdominal pain. Negative for abdominal distention, diarrhea, nausea and vomiting.   Endocrine: Negative.    Genitourinary:  Negative for difficulty urinating and dysuria.   Musculoskeletal:  Negative for arthralgias, gait problem and joint swelling.   Skin: Negative.     Neurological: Negative.    Psychiatric/Behavioral: Negative.     All other systems reviewed and are negative.    Physical Examination:    Visit Vitals  /63   Pulse 92   Temp 37.1 °C (98.8 °F) (Temporal)   Resp 16      Physical Exam  Vitals and nursing note reviewed.   Constitutional:       General: She is not in acute distress.     Appearance: Normal appearance.   HENT:      Head: Normocephalic.      Nose: Nose normal.      Mouth/Throat:      Mouth: Mucous membranes are moist.   Eyes:      Extraocular Movements: Extraocular movements intact.      Pupils: Pupils are equal, round, and reactive to light.   Cardiovascular:      Rate and Rhythm: Normal rate and regular rhythm.      Pulses: Normal pulses.      Heart sounds: Normal heart sounds.   Pulmonary:      Effort: Pulmonary effort is normal.      Breath sounds: Normal breath sounds.   Abdominal:      General: Bowel sounds are normal.      Palpations: Abdomen is soft.      Tenderness: There is abdominal tenderness (bilateral lower abdomen with palpation).   Musculoskeletal:      Cervical back: Normal range of motion. No rigidity or tenderness.   Skin:     General: Skin is warm.      Capillary Refill: Capillary refill takes less than 2 seconds.   Neurological:      General: No focal deficit present.      Mental Status: She is alert and oriented to person, place, and time.   Psychiatric:         Mood and Affect: Mood normal.       LABS:  CBC:   Lab Results   Component Value Date    WBC 11.4 (H) 07/09/2025    RBC 3.85 (L) 07/09/2025    HGB 12.7 07/09/2025    HCT 36.7 07/09/2025    MCV 95 07/09/2025    MCH 33.0 07/09/2025    MCHC 34.6 07/09/2025    RDW 13.7 07/09/2025     07/09/2025     CBC with Differential:    Lab Results   Component Value Date    WBC 11.4 (H) 07/09/2025    RBC 3.85 (L) 07/09/2025    HGB 12.7 07/09/2025    HCT 36.7 07/09/2025     07/09/2025    MCV 95 07/09/2025    MCH 33.0 07/09/2025    MCHC 34.6 07/09/2025    RDW 13.7 07/09/2025  "   NRBC 0.0 07/09/2025    LYMPHOPCT 5.2 07/09/2025    MONOPCT 4.9 07/09/2025    EOSPCT 0.2 07/09/2025    BASOPCT 0.4 07/09/2025    MONOSABS 0.56 07/09/2025    LYMPHSABS 0.59 (L) 07/09/2025    EOSABS 0.02 07/09/2025    BASOSABS 0.05 07/09/2025     CMP:    Lab Results   Component Value Date     (L) 07/09/2025    K 3.3 (L) 07/09/2025    CL 96 (L) 07/09/2025    CO2 28 07/09/2025    BUN 9 07/09/2025    CREATININE 0.74 07/09/2025    GLUCOSE 112 (H) 07/09/2025    PROT 7.9 07/09/2025    CALCIUM 9.8 07/09/2025    BILITOT 1.3 (H) 07/09/2025    ALKPHOS 71 07/09/2025    AST 15 07/09/2025    ALT 23 07/09/2025     BMP:    Lab Results   Component Value Date     (L) 07/09/2025    K 3.3 (L) 07/09/2025    CL 96 (L) 07/09/2025    CO2 28 07/09/2025    BUN 9 07/09/2025    CREATININE 0.74 07/09/2025    CALCIUM 9.8 07/09/2025    GLUCOSE 112 (H) 07/09/2025     Magnesium:  Lab Results   Component Value Date    MG 1.78 07/09/2025     Troponin:    Lab Results   Component Value Date    TROPHS 3 07/09/2025     Lipid Panel:  No results found for: \"HDL\", \"CHHDL\", \"VLDL\", \"TRIG\", \"NHDL\"   Current Medications:  Current Medications[6]  CT abdomen pelvis w IV contrast  Result Date: 7/9/2025  Interpreted By:  Manohar Montana, STUDY: CT ABDOMEN PELVIS W IV CONTRAST;  7/9/2025 4:26 pm   INDICATION: Signs/Symptoms:Abdominal pain.   COMPARISON: None.   ACCESSION NUMBER(S): [id number]   ORDERING CLINICIAN: SIMONE BAJWA   TECHNIQUE: CT of the abdomen and pelvis was performed.  Standard contiguous axial images were obtained at 3 mm slice thickness through the abdomen and pelvis. Coronal and sagittal reconstructions at 3 mm slice thickness were performed.   75 ml of contrast Omnipaque 350 were administered intravenously without immediate complication.   FINDINGS: LOWER CHEST: Mild bibasilar atelectasis.   ABDOMEN:   LIVER: The liver is enlarged measuring 21.5 cm in craniocaudal dimension and demonstrates diffusely decreased attenuation " suggesting steatosis. Subcentimeter low-attenuation lesions too small to characterize likely benign.   BILE DUCTS: The intrahepatic and extrahepatic ducts are not dilated.   GALLBLADDER: The gallbladder is nondistended and without evidence of radiopaque stones.   PANCREAS: The pancreas appears unremarkable without evidence of ductal dilatation or masses.   SPLEEN: The spleen is normal in size without focal lesions.   ADRENAL GLANDS: Bilateral adrenal glands appear normal.   KIDNEYS AND URETERS: The kidneys are normal in size and enhance symmetrically.  No hydroureteronephrosis or nephroureterolithiasis is identified.   PELVIS:   BLADDER: The urinary bladder appears normal without abnormal wall thickening.   REPRODUCTIVE ORGANS: No pelvic masses.   BOWEL: Small hiatal hernia. The stomach is otherwise unremarkable. No bowel dilation. Colonic diverticulosis, with sigmoid wall thickening and surrounding soft tissue stranding, compatible with acute diverticulitis. Few scattered foci of extraluminal gas, likely secondary to contained microperforations. No drainable fluid collection. The appendix is not definitely visualized. There is however no pericecal stranding or fluid.   VESSELS: There is no aneurysmal dilatation of the abdominal aorta. The IVC appears normal.   PERITONEUM/RETROPERITONEUM/LYMPH NODES: No ascites or free air, no fluid collection.  No abdominopelvic lymphadenopathy is present.   BONES AND ABDOMINAL WALL: No suspicious osseous lesions are identified.  The abdominal wall soft tissues appear normal.       1.  Acute sigmoid diverticulitis, with small microperforations. No drainable abscess. 2. Hepatomegaly and diffuse steatosis.     Signed by: Manohar Montana 7/9/2025 4:42 PM Dictation workstation:   [id number]    ECG 12 lead  Result Date: 7/9/2025  Normal sinus rhythm Normal ECG When compared with ECG of 16-JUL-2007 15:05, Nonspecific T wave abnormality now evident in Anterior leads     No results  found for this or any previous visit from the past 1095 days.           Assessment:    Acute diverticulitis     Upon assessment patient is resting comfortably in bed in no acute distress. She reports her lower abdominal pain to have started yesterday at 1400 with no change in bowel habits, nausea or vomiting but she did have low grade fever. Her abdomen is soft, distended, bowel sounds are present and she has some tenderness to palpation of her lower abdomen.     She does report that she would like to go home as soon as possible as she has a flight to catch tomorrow at 1430.     Plan:  -Diet: clear liquids   -Pain control  -Nausea control  -DVT prophylaxis- Lovenox   -Pulmonary toileting   -Encourage ambulation  -CIWA scale with phenobarb taper       Further recommendations per Dr. De La Garza    Time spent  60  minutes obtaining labs, imaging, recommendations, interview, assessment, examination, medication review/ordering, and EMR review.    Plan of care was discussed extensively with patient. Patient verbalized understanding through teach back method. All questions and concerns addressed upon examination.     Of note, this documentation is completed using the Dragon Dictation system (voice recognition software). There may be spelling and/or grammatical errors that were not corrected prior to final submission.    Electronically signed by TARIQ Vizcaino, ANEL on 7/9/2025 at 10:01 PM       [1] No Known Allergies  [2]   Past Medical History:  Diagnosis Date    Abnormal Pap smear of cervix     CTS (carpal tunnel syndrome)     Disease of thyroid gland     HPV (human papilloma virus) infection     Hypertension     Hypothyroidism     Plantar fasciitis     Tendinitis     Varicella    [3]   Past Surgical History:  Procedure Laterality Date    CERVICAL BIOPSY  W/ LOOP ELECTRODE EXCISION      COLPOSCOPY      OTHER SURGICAL HISTORY  10/12/2022    Corneal lasik    OTHER SURGICAL HISTORY  10/12/2022    [place] tooth  extraction   [4]   Social History  Socioeconomic History    Marital status:    Tobacco Use    Smoking status: Never    Smokeless tobacco: Never   Substance and Sexual Activity    Alcohol use: Yes     Alcohol/week: 10.0 standard drinks of alcohol     Types: 10 Glasses of wine per week    Drug use: Never    Sexual activity: Yes     Partners: Male     Birth control/protection: None     Social Drivers of Health     Financial Resource Strain: Patient Declined (7/9/2025)    Overall Financial Resource Strain (CARDIA)     Difficulty of Paying Living Expenses: Patient declined   Food Insecurity: Patient Declined (7/9/2025)    Hunger Vital Sign     Worried About Running Out of Food in the Last Year: Patient declined     Ran Out of Food in the Last Year: Patient declined   Transportation Needs: Patient Declined (7/9/2025)    PRAPARE - Transportation     Lack of Transportation (Medical): Patient declined     Lack of Transportation (Non-Medical): Patient declined   Physical Activity: Patient Declined (7/9/2025)    Exercise Vital Sign     Days of Exercise per Week: Patient declined     Minutes of Exercise per Session: Patient declined   Stress: Patient Declined (7/9/2025)    [employer] - Occupational Stress Questionnaire     Feeling of Stress : Patient declined   Social Connections: Patient Declined (7/9/2025)    Social Connection and Isolation Panel [NHANES]     Frequency of Communication with Friends and Family: Patient declined     Frequency of Social Gatherings with Friends and Family: Patient declined     Attends [employer] Services: Patient declined     Active Member of Clubs or Organizations: Patient declined     Attends Club or Organization Meetings: Patient declined     Marital Status: Patient declined   Intimate Partner Violence: Not At Risk (7/9/2025)    Humiliation, Afraid, Rape, and Kick questionnaire     Fear of Current or Ex-Partner: No     Emotionally Abused: No     Physically  Abused: No     Sexually Abused: No   Housing Stability: Patient Declined (7/9/2025)    Housing Stability Vital Sign     Unable to Pay for Housing in the Last Year: Patient declined     Number of Times Moved in the Last Year: 0     Homeless in the Last Year: Patient declined   [5]   Family History  Problem Relation Name Age of Onset    Breast cancer Sister Maddi     Drug abuse Sister Maddi     COPD Mother Maddi     Hearing loss Mother Maddi     Hypertension Mother Maddi     COPD Father Melvin     Hypertension Father Melvin     Alcohol abuse Sister Ghada     Autoimmune disease Sister Desire     Mental illness Sister Jennifer    [6]   Current Facility-Administered Medications:     docusate sodium (Colace) capsule 100 mg, 100 mg, oral, BID, TARIQ Quispe    enoxaparin (Lovenox) syringe 40 mg, 40 mg, subcutaneous, q24h, TARIQ Quispe    [START ON 7/10/2025] folic acid (Folvite) tablet 1 mg, 1 mg, oral, Daily, TARIQ Vizcaino DNP    ketorolac (Toradol) injection 15 mg, 15 mg, intravenous, q6h PRN, TRAIQ Vizcaino DNP    lactated Ringer's infusion, 75 mL/hr, intravenous, Continuous, TARIQ Quispe    LORazepam (Ativan) tablet 0.5 mg, 0.5 mg, oral, q2h PRN **OR** LORazepam (Ativan) tablet 1 mg, 1 mg, oral, q2h PRN **OR** LORazepam (Ativan) tablet 2 mg, 2 mg, oral, q2h PRN, TARIQ Vizcaino DNP    [START ON 7/10/2025] multivitamin with minerals 1 tablet, 1 tablet, oral, Daily, TARIQ Vizcaino DNP    ondansetron (Zofran) tablet 4 mg, 4 mg, oral, q8h PRN **OR** ondansetron (Zofran) injection 4 mg, 4 mg, intravenous, q8h PRN, TARIQ Quispe    oxyCODONE (Roxicodone) immediate release tablet 5 mg, 5 mg, oral, q4h PRN, TARIQ Quispe    oxyCODONE (Roxicodone) immediate release tablet 7.5 mg, 7.5 mg, oral, q4h PRN, TARIQ Quispe    [START ON 7/10/2025] pantoprazole (ProtoNix) EC tablet 40 mg, 40 mg, oral, Daily  before breakfast **OR** [START ON 7/10/2025] pantoprazole (Protonix) injection 40 mg, 40 mg, intravenous, Daily before breakfast, TARIQ Quispe    PHENobarbital (Luminal) tablet 129.6 mg, 2.4 mg/kg (Ideal), oral, Once, TARIQ Vizcaino DNP    PHENobarbital (Luminal) tablet 64.8 mg, 64.8 mg, oral, TID **FOLLOWED BY** [START ON 7/11/2025] PHENobarbital (Luminal) tablet 32.4 mg, 32.4 mg, oral, TID, TARIQ Vizcaino DNP    PHENobarbital (Luminal) tablet 64.8 mg, 64.8 mg, oral, q6h PRN, TARIQ Vizcaino DNP    [START ON 7/10/2025] PHENobarbital (Luminal) tablet 97.2 mg, 1.8 mg/kg (Ideal), oral, q3h, TARIQ Vizcaino DNP    piperacillin-tazobactam (Zosyn) 3.375 g in dextrose (iso) IV 50 mL, 3.375 g, intravenous, q8h, TARIQ Quispe    [START ON 7/10/2025] thiamine (Vitamin B-1) tablet 100 mg, 100 mg, oral, Daily, TARIQ Vizacino DNP

## 2025-07-10 NOTE — CARE PLAN
The patient's goals for the shift include to have pain controlled    The clinical goals for the shift include Pt willbe free from fall/ injury throughout this shift.

## 2025-07-10 NOTE — PROGRESS NOTES
General Surgery Progress Note    Patient: Nita Miller  Unit/Bed: 1123/1123-A  YOB: 1975  MRN: [id number]  Acct: [id number]   Admitting Diagnosis: Suprapubic abdominal pain [R10.2]  Acute diverticulitis [K57.92]  Diverticulitis of intestine with perforation and bleeding without abscess, unspecified part of intestinal tract [K57.81]  Date:  7/9/2025  Hospital Day: 1  Attending: Gigi De La Garza MD    Complaint:  Chief Complaint   Patient presents with    Abdominal Pain     Across lower abdomen. Described as pressure. Standing makes it worse, bumps in car, pain with urination. Soft/foul smelling BM. Last night body aches, chills, fever.       Subjective  Patient seen and examined this morning. No acute events overnight.  Patient denies nausea vomiting.  Patient states she does have some abdominal tenderness but much improved from yesterday.  She states she is passing gas but no bowel movement.  She would like to go home today.    PHYSICAL EXAM:  Physical Exam  Vitals reviewed.   Constitutional:       Appearance: Normal appearance.   HENT:      Head: Normocephalic.      Nose: Nose normal.      Mouth/Throat:      Mouth: Mucous membranes are moist.   Cardiovascular:      Rate and Rhythm: Normal rate.   Pulmonary:      Effort: Pulmonary effort is normal.   Abdominal:      General: Abdomen is flat. Bowel sounds are normal.      Palpations: Abdomen is soft.      Tenderness: There is abdominal tenderness in the left lower quadrant.   Skin:     General: Skin is warm.      Capillary Refill: Capillary refill takes less than 2 seconds.   Neurological:      General: No focal deficit present.      Mental Status: She is alert and oriented to person, place, and time.   Psychiatric:         Mood and Affect: Mood normal.       Vital signs in last 24 hours:  Vitals:    07/10/25 0445   BP: 145/75   Pulse: 82   Resp: 16   Temp: 36.8 °C (98.2 °F)   SpO2: 97%     Intake/Output this shift:    Intake/Output Summary (Last  24 hours) at 7/10/2025 0753  Last data filed at 7/10/2025 0424  Gross per 24 hour   Intake 561 ml   Output --   Net 561 ml      Allergies:  Allergies[1]   Medications:  Scheduled medications  Scheduled Medications[2]  Continuous medications  Continuous Medications[3]  PRN medications  PRN Medications[4]  Labs:  Results for orders placed or performed during the hospital encounter of 07/09/25 (from the past 24 hours)   ECG 12 lead   Result Value Ref Range    Ventricular Rate 93 BPM    Atrial Rate 93 BPM    OK Interval 174 ms    QRS Duration 94 ms    QT Interval 354 ms    QTC Calculation(Bazett) 440 ms    P Axis 21 degrees    R Axis -2 degrees    T Axis 16 degrees    QRS Count 15 beats    Q Onset 218 ms    P Onset 131 ms    P Offset 184 ms    T Offset 395 ms    QTC Fredericia 410 ms   CBC and Auto Differential   Result Value Ref Range    WBC 11.4 (H) 4.4 - 11.3 x10*3/uL    nRBC 0.0 0.0 - 0.0 /100 WBCs    RBC 3.85 (L) 4.00 - 5.20 x10*6/uL    Hemoglobin 12.7 12.0 - 16.0 g/dL    Hematocrit 36.7 36.0 - 46.0 %    MCV 95 80 - 100 fL    MCH 33.0 26.0 - 34.0 pg    MCHC 34.6 32.0 - 36.0 g/dL    RDW 13.7 11.5 - 14.5 %    Platelets 245 150 - 450 x10*3/uL    Neutrophils % 88.8 40.0 - 80.0 %    Immature Granulocytes %, Automated 0.5 0.0 - 0.9 %    Lymphocytes % 5.2 13.0 - 44.0 %    Monocytes % 4.9 2.0 - 10.0 %    Eosinophils % 0.2 0.0 - 6.0 %    Basophils % 0.4 0.0 - 2.0 %    Neutrophils Absolute 10.08 (H) 1.20 - 7.70 x10*3/uL    Immature Granulocytes Absolute, Automated 0.06 0.00 - 0.70 x10*3/uL    Lymphocytes Absolute 0.59 (L) 1.20 - 4.80 x10*3/uL    Monocytes Absolute 0.56 0.10 - 1.00 x10*3/uL    Eosinophils Absolute 0.02 0.00 - 0.70 x10*3/uL    Basophils Absolute 0.05 0.00 - 0.10 x10*3/uL   Magnesium   Result Value Ref Range    Magnesium 1.78 1.60 - 2.40 mg/dL   Comprehensive metabolic panel   Result Value Ref Range    Glucose 112 (H) 74 - 99 mg/dL    Sodium 135 (L) 136 - 145 mmol/L    Potassium 3.3 (L) 3.5 - 5.3 mmol/L     Chloride 96 (L) 98 - 107 mmol/L    Bicarbonate 28 21 - 32 mmol/L    Anion Gap 14 10 - 20 mmol/L    Urea Nitrogen 9 6 - 23 mg/dL    Creatinine 0.74 0.50 - 1.05 mg/dL    eGFR >90 >60 mL/min/1.73m*2    Calcium 9.8 8.6 - 10.3 mg/dL    Albumin 4.6 3.4 - 5.0 g/dL    Alkaline Phosphatase 71 33 - 110 U/L    Total Protein 7.9 6.4 - 8.2 g/dL    AST 15 9 - 39 U/L    Bilirubin, Total 1.3 (H) 0.0 - 1.2 mg/dL    ALT 23 7 - 45 U/L   Lipase   Result Value Ref Range    Lipase 10 9 - 82 U/L   Lactate   Result Value Ref Range    Lactate 1.3 0.4 - 2.0 mmol/L   Troponin I, High Sensitivity   Result Value Ref Range    Troponin I, High Sensitivity 3 0 - 13 ng/L   Urinalysis with Reflex Culture and Microscopic   Result Value Ref Range    Color, Urine Colorless (N) Light-Yellow, Yellow, Dark-Yellow    Appearance, Urine Clear Clear    Specific Gravity, Urine 1.021 1.005 - 1.035    pH, Urine 7.0 5.0, 5.5, 6.0, 6.5, 7.0, 7.5, 8.0    Protein, Urine NEGATIVE NEGATIVE, 10 (TRACE), 20 (TRACE) mg/dL    Glucose, Urine Normal Normal mg/dL    Blood, Urine NEGATIVE NEGATIVE mg/dL    Ketones, Urine NEGATIVE NEGATIVE mg/dL    Bilirubin, Urine NEGATIVE NEGATIVE mg/dL    Urobilinogen, Urine Normal Normal mg/dL    Nitrite, Urine NEGATIVE NEGATIVE    Leukocyte Esterase, Urine NEGATIVE NEGATIVE   CBC   Result Value Ref Range    WBC 8.0 4.4 - 11.3 x10*3/uL    nRBC 0.0 0.0 - 0.0 /100 WBCs    RBC 3.47 (L) 4.00 - 5.20 x10*6/uL    Hemoglobin 11.5 (L) 12.0 - 16.0 g/dL    Hematocrit 33.9 (L) 36.0 - 46.0 %    MCV 98 80 - 100 fL    MCH 33.1 26.0 - 34.0 pg    MCHC 33.9 32.0 - 36.0 g/dL    RDW 13.8 11.5 - 14.5 %    Platelets 200 150 - 450 x10*3/uL   Basic metabolic panel   Result Value Ref Range    Glucose 101 (H) 74 - 99 mg/dL    Sodium 137 136 - 145 mmol/L    Potassium 3.8 3.5 - 5.3 mmol/L    Chloride 101 98 - 107 mmol/L    Bicarbonate 29 21 - 32 mmol/L    Anion Gap 11 10 - 20 mmol/L    Urea Nitrogen 9 6 - 23 mg/dL    Creatinine 0.90 0.50 - 1.05 mg/dL    eGFR 78 >60  mL/min/1.73m*2    Calcium 9.2 8.6 - 10.3 mg/dL      Imaging:  Imaging  CT abdomen pelvis w IV contrast  Result Date: 7/9/2025  1.  Acute sigmoid diverticulitis, with small microperforations. No drainable abscess. 2. Hepatomegaly and diffuse steatosis.     Signed by: Manohar Montana 7/9/2025 4:42 PM Dictation workstation:   [id number]      Cardiology, Vascular, and Other Imaging  ECG 12 lead  Result Date: 7/9/2025  Normal sinus rhythm Normal ECG When compared with ECG of 16-JUL-2007 15:05, Nonspecific T wave abnormality now evident in Anterior leads       Assessment  Acute diverticulitis     Exam abdomen is soft, nondistended, tenderness with palpation to left lower quadrant.  Bowel sounds present x 4 quadrant.  This morning show no leukocytosis.  Afebrile.    Plan  -Advance to low fiber diet if patient tolerates can go home later this afternoon on PO ABX  -Continue IV ABX  -DC IVF  -Pain control  -Nausea control  -DVT prophylaxis-Lovenox  -Pulmonary toileting   -Encourage ambulation      Further recommendations per Dr. Holli Arboleda, APRN-CNP    Time spent  37  minutes obtaining labs, imaging, recommendations, interview, assessment, examination, medication review/ordering, and EMR review.    Plan of care was discussed extensively with patient. Patient verbalized understanding through teach back method. All questions and concerns addressed upon examination.     Of note, this documentation is completed using the Dragon Dictation system (voice recognition software). There may be spelling and/or grammatical errors that were not corrected prior to final submission.         [1] No Known Allergies  [2] docusate sodium, 100 mg, oral, BID  enoxaparin, 40 mg, subcutaneous, q24h  folic acid, 1 mg, oral, Daily  multivitamin with minerals, 1 tablet, oral, Daily  pantoprazole, 40 mg, oral, Daily before breakfast   Or  pantoprazole, 40 mg, intravenous, Daily before breakfast  PHENobarbital, 2.4 mg/kg (Ideal), oral,  Once  PHENobarbital, 64.8 mg, oral, TID   Followed by  [START ON 7/11/2025] PHENobarbital, 32.4 mg, oral, TID  piperacillin-tazobactam, 3.375 g, intravenous, q6h  thiamine, 100 mg, oral, Daily  [3] lactated Ringer's, 75 mL/hr  [4] PRN medications: ketorolac, LORazepam **OR** LORazepam **OR** LORazepam, ondansetron **OR** ondansetron, oxyCODONE, oxyCODONE, PHENobarbital

## 2025-07-30 LAB
ATRIAL RATE: 93 BPM
P AXIS: 21 DEGREES
P OFFSET: 184 MS
P ONSET: 131 MS
PR INTERVAL: 174 MS
Q ONSET: 218 MS
QRS COUNT: 15 BEATS
QRS DURATION: 94 MS
QT INTERVAL: 354 MS
QTC CALCULATION(BAZETT): 440 MS
QTC FREDERICIA: 410 MS
R AXIS: -2 DEGREES
T AXIS: 16 DEGREES
T OFFSET: 395 MS
VENTRICULAR RATE: 93 BPM